# Patient Record
Sex: FEMALE | Race: OTHER
[De-identification: names, ages, dates, MRNs, and addresses within clinical notes are randomized per-mention and may not be internally consistent; named-entity substitution may affect disease eponyms.]

---

## 2019-06-14 ENCOUNTER — HOSPITAL ENCOUNTER (INPATIENT)
Dept: HOSPITAL 63 - GEROPSY | Age: 69
LOS: 10 days | Discharge: HOME | DRG: 57 | End: 2019-06-24
Attending: PSYCHIATRY & NEUROLOGY | Admitting: PSYCHIATRY & NEUROLOGY
Payer: MEDICARE

## 2019-06-14 VITALS — BODY MASS INDEX: 36.08 KG/M2 | HEIGHT: 66 IN | WEIGHT: 224.5 LBS

## 2019-06-14 VITALS — DIASTOLIC BLOOD PRESSURE: 85 MMHG | SYSTOLIC BLOOD PRESSURE: 127 MMHG

## 2019-06-14 DIAGNOSIS — Z83.3: ICD-10-CM

## 2019-06-14 DIAGNOSIS — F31.64: ICD-10-CM
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
## 2019-06-14 DIAGNOSIS — Z86.73: ICD-10-CM
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
## 2019-06-14 DIAGNOSIS — Z86.718: ICD-10-CM

## 2019-06-14 DIAGNOSIS — Z88.5: ICD-10-CM

## 2019-06-14 DIAGNOSIS — F01.50: ICD-10-CM

## 2019-06-14 DIAGNOSIS — Z86.19: ICD-10-CM

## 2019-06-14 DIAGNOSIS — E11.9: ICD-10-CM

## 2019-06-14 DIAGNOSIS — Z79.899: ICD-10-CM

## 2019-06-14 DIAGNOSIS — Z79.4: ICD-10-CM
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
## 2019-06-14 DIAGNOSIS — Z88.8: ICD-10-CM

## 2019-06-14 DIAGNOSIS — G47.33: ICD-10-CM

## 2019-06-14 DIAGNOSIS — J45.909: ICD-10-CM
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
## 2019-06-14 DIAGNOSIS — R29.6: ICD-10-CM

## 2019-06-14 DIAGNOSIS — I10: ICD-10-CM

## 2019-06-14 DIAGNOSIS — Z82.49: ICD-10-CM

## 2019-06-14 DIAGNOSIS — Z90.49: ICD-10-CM

## 2019-06-14 DIAGNOSIS — Z87.891: ICD-10-CM

## 2019-06-14 DIAGNOSIS — F63.9: ICD-10-CM

## 2019-06-14 DIAGNOSIS — S02.2XXA: ICD-10-CM

## 2019-06-14 DIAGNOSIS — K57.30: ICD-10-CM

## 2019-06-14 DIAGNOSIS — Z86.711: ICD-10-CM

## 2019-06-14 DIAGNOSIS — F02.80: ICD-10-CM

## 2019-06-14 DIAGNOSIS — Z79.01: ICD-10-CM

## 2019-06-14 DIAGNOSIS — F41.9: ICD-10-CM

## 2019-06-14 DIAGNOSIS — Z91.81: ICD-10-CM

## 2019-06-14 DIAGNOSIS — G30.9: Primary | ICD-10-CM

## 2019-06-14 DIAGNOSIS — S00.83XA: ICD-10-CM

## 2019-06-14 DIAGNOSIS — E78.5: ICD-10-CM

## 2019-06-14 DIAGNOSIS — Z91.018: ICD-10-CM

## 2019-06-14 DIAGNOSIS — G40.409: ICD-10-CM

## 2019-06-14 DIAGNOSIS — E89.0: ICD-10-CM

## 2019-06-14 DIAGNOSIS — D68.51: ICD-10-CM

## 2019-06-14 DIAGNOSIS — Z91.14: ICD-10-CM

## 2019-06-14 DIAGNOSIS — Z98.51: ICD-10-CM

## 2019-06-14 DIAGNOSIS — Z91.040: ICD-10-CM

## 2019-06-14 DIAGNOSIS — Z87.11: ICD-10-CM

## 2019-06-14 PROCEDURE — 73130 X-RAY EXAM OF HAND: CPT

## 2019-06-14 PROCEDURE — 86592 SYPHILIS TEST NON-TREP QUAL: CPT

## 2019-06-14 PROCEDURE — 82140 ASSAY OF AMMONIA: CPT

## 2019-06-14 PROCEDURE — 36415 COLL VENOUS BLD VENIPUNCTURE: CPT

## 2019-06-14 PROCEDURE — 80053 COMPREHEN METABOLIC PANEL: CPT

## 2019-06-14 PROCEDURE — 85610 PROTHROMBIN TIME: CPT

## 2019-06-14 PROCEDURE — 83036 HEMOGLOBIN GLYCOSYLATED A1C: CPT

## 2019-06-14 PROCEDURE — 84436 ASSAY OF TOTAL THYROXINE: CPT

## 2019-06-14 PROCEDURE — 83550 IRON BINDING TEST: CPT

## 2019-06-14 PROCEDURE — 83735 ASSAY OF MAGNESIUM: CPT

## 2019-06-14 PROCEDURE — 81001 URINALYSIS AUTO W/SCOPE: CPT

## 2019-06-14 PROCEDURE — 83540 ASSAY OF IRON: CPT

## 2019-06-14 PROCEDURE — 93005 ELECTROCARDIOGRAM TRACING: CPT

## 2019-06-14 PROCEDURE — 84480 ASSAY TRIIODOTHYRONINE (T3): CPT

## 2019-06-14 PROCEDURE — 80061 LIPID PANEL: CPT

## 2019-06-14 PROCEDURE — 82947 ASSAY GLUCOSE BLOOD QUANT: CPT

## 2019-06-14 PROCEDURE — 85025 COMPLETE CBC W/AUTO DIFF WBC: CPT

## 2019-06-14 PROCEDURE — 80164 ASSAY DIPROPYLACETIC ACD TOT: CPT

## 2019-06-14 PROCEDURE — 84443 ASSAY THYROID STIM HORMONE: CPT

## 2019-06-14 PROCEDURE — 82306 VITAMIN D 25 HYDROXY: CPT

## 2019-06-14 RX ADMIN — INSULIN HUMAN SCH UNITS: 100 INJECTION, SUSPENSION SUBCUTANEOUS at 19:53

## 2019-06-14 RX ADMIN — PANTOPRAZOLE SODIUM SCH MG: 40 TABLET, DELAYED RELEASE ORAL at 21:07

## 2019-06-14 RX ADMIN — LACOSAMIDE SCH MG: 50 TABLET, FILM COATED ORAL at 21:07

## 2019-06-14 NOTE — PDOC
Exam


Note:


Darin Note:


Please also refer to the separate dictated note~for this date of service 

dictated separately. Discussed the patient with Nursing staff reviewed the 

chart.~Reviewed interim history and current functioning. Reviewed vital 

signs,~Labs/ Radiology~and current medications noted below. Continue current 

treatment with the changes noted in the dictated addendum note





Assessment:


Vital Signs/I&O:





                                   Vital Signs








  Date Time  Temp Pulse Resp B/P (MAP) Pulse Ox O2 Delivery O2 Flow Rate FiO2


 


6/14/19 14:30 98.4 75 16 127/85 (99) 98   








Labs:





                                Laboratory Tests








Test


 6/14/19


16:30 6/14/19


19:44


 


Glucose (Fingerstick)


 318 mg/dL


(70-99)  H 492 mg/dL


(70-99)  H











Current Medications:


Meds:





Current Medications








 Medications


  (Trade)  Dose


 Ordered  Sig/Carly


 Route


 PRN Reason  Start Time


 Stop Time Status Last Admin


Dose Admin


 


 Olanzapine


  (ZyPREXA ZYDIS)  5 mg  QHS


 PO


   6/14/19 21:00


    6/14/19 21:07





 


 Lacosamide


  (Vimpat)  200 mg  BID


 PO


   6/14/19 21:00


    6/14/19 21:07





 


 Levetiracetam


  (Keppra)  500 mg  BID


 PO


   6/14/19 21:00


    6/14/19 21:07





 


 Pantoprazole


 Sodium


  (Protonix)  40 mg  QHS


 PO


   6/14/19 21:00


    6/14/19 21:07





 


 Warfarin Sodium


  (Coumadin)  5 mg  1X WARF  ONCE


 PO


   6/14/19 17:30


 6/14/19 17:31 DC 6/14/19 17:54





 


 Insulin Human


 Isoph/Insulin


 Regular


  (HumuLIN 70/30)  20 units  BIDWMEALS


 SQ


   6/14/19 20:00


    6/14/19 19:53





 


 Insulin Human


 Regular


  (HumuLIN R VIAL)  20 unit  1X  ONCE


 SQ


   6/14/19 20:30


 6/14/19 20:36 DC 6/14/19 20:54











I have reviewed the current psychotropics carefully including drug interactions.

 Risk benefit ratio favors no change other than as noted in my dictated progress

note.





Diagnosis:


Problems:  


(1) Anxiety disorder


(2) Impulse control disorder


(3) Psychosis, atypical


(4) Dementia, vascular, with delusions











SHANTANU CALLAHAN MD                 Jun 14, 2019 22:45

## 2019-06-15 VITALS — SYSTOLIC BLOOD PRESSURE: 124 MMHG | DIASTOLIC BLOOD PRESSURE: 78 MMHG

## 2019-06-15 VITALS — SYSTOLIC BLOOD PRESSURE: 162 MMHG | DIASTOLIC BLOOD PRESSURE: 89 MMHG

## 2019-06-15 LAB
ALBUMIN SERPL-MCNC: 3.5 G/DL (ref 3.4–5)
ALBUMIN/GLOB SERPL: 0.9 {RATIO} (ref 1–1.7)
ALP SERPL-CCNC: 81 U/L (ref 46–116)
ALT SERPL-CCNC: 21 U/L (ref 14–59)
ANION GAP SERPL CALC-SCNC: 6 MMOL/L (ref 6–14)
APTT PPP: YELLOW S
AST SERPL-CCNC: 16 U/L (ref 15–37)
BACTERIA #/AREA URNS HPF: 0 /HPF
BASOPHILS # BLD AUTO: 0 X10^3/UL (ref 0–0.2)
BASOPHILS NFR BLD: 1 % (ref 0–3)
BILIRUB SERPL-MCNC: 0.5 MG/DL (ref 0.2–1)
BILIRUB UR QL STRIP: (no result)
BUN/CREAT SERPL: 16 (ref 6–20)
CA-I SERPL ISE-MCNC: 18 MG/DL (ref 7–20)
CALCIUM SERPL-MCNC: 9.1 MG/DL (ref 8.5–10.1)
CHLORIDE SERPL-SCNC: 102 MMOL/L (ref 98–107)
CHOLEST/HDLC SERPL: 3 {RATIO}
CO2 SERPL-SCNC: 32 MMOL/L (ref 21–32)
CREAT SERPL-MCNC: 1.1 MG/DL (ref 0.6–1)
EOSINOPHIL NFR BLD: 0.3 X10^3/UL (ref 0–0.7)
EOSINOPHIL NFR BLD: 8 % (ref 0–3)
ERYTHROCYTE [DISTWIDTH] IN BLOOD BY AUTOMATED COUNT: 17.6 % (ref 11.5–14.5)
FIBRINOGEN PPP-MCNC: CLEAR MG/DL
GFR SERPLBLD BASED ON 1.73 SQ M-ARVRAT: 49 ML/MIN
GLOBULIN SER-MCNC: 3.8 G/DL (ref 2.2–3.8)
GLUCOSE SERPL-MCNC: 148 MG/DL (ref 70–99)
GLUCOSE UR STRIP-MCNC: 100 MG/DL
HCT VFR BLD CALC: 36.1 % (ref 36–47)
HDLC SERPL-MCNC: 45 MG/DL (ref 40–60)
HGB BLD-MCNC: 11.9 G/DL (ref 12–15.5)
LDLC: 85 MG/DL (ref 0–100)
LYMPHOCYTES # BLD: 1.5 X10^3/UL (ref 1–4.8)
LYMPHOCYTES NFR BLD AUTO: 45 % (ref 24–48)
MAGNESIUM SERPL-MCNC: 1.8 MG/DL (ref 1.8–2.4)
MCH RBC QN AUTO: 30 PG (ref 25–35)
MCHC RBC AUTO-ENTMCNC: 33 G/DL (ref 31–37)
MCV RBC AUTO: 92 FL (ref 79–100)
MONO #: 0.3 X10^3/UL (ref 0–1.1)
MONOCYTES NFR BLD: 8 % (ref 0–9)
NEUT #: 1.3 X10^3UL (ref 1.8–7.7)
NEUTROPHILS NFR BLD AUTO: 39 % (ref 31–73)
NITRITE UR QL STRIP: (no result)
PLATELET # BLD AUTO: 169 X10^3/UL (ref 140–400)
POTASSIUM SERPL-SCNC: 3.9 MMOL/L (ref 3.5–5.1)
PROT SERPL-MCNC: 7.3 G/DL (ref 6.4–8.2)
RBC # BLD AUTO: 3.91 X10^6/UL (ref 3.5–5.4)
RBC #/AREA URNS HPF: 0 /HPF (ref 0–2)
SODIUM SERPL-SCNC: 140 MMOL/L (ref 136–145)
SP GR UR STRIP: 1.01
SQUAMOUS #/AREA URNS LPF: (no result) /LPF
T3 SERPL-MCNC: 81 NG/DL (ref 71–180)
T4 SERPL-MCNC: 9.7 UG/DL (ref 4.5–12)
THYROID STIM HORMONE (TSH): 6.61 UIU/ML (ref 0.36–3.74)
TRIGL SERPL-MCNC: 61 MG/DL (ref 0–150)
UROBILINOGEN UR-MCNC: 0.2 MG/DL
VAL ACID: 4 MCG/ML (ref 50–100)
VLDLC: 12 MG/DL (ref 0–40)
WBC # BLD AUTO: 3.3 X10^3/UL (ref 4–11)
WBC #/AREA URNS HPF: (no result) /HPF (ref 0–4)

## 2019-06-15 RX ADMIN — INSULIN HUMAN SCH UNITS: 100 INJECTION, SUSPENSION SUBCUTANEOUS at 17:08

## 2019-06-15 RX ADMIN — Medication SCH MCG: at 08:14

## 2019-06-15 RX ADMIN — LACOSAMIDE SCH MG: 50 TABLET, FILM COATED ORAL at 08:12

## 2019-06-15 RX ADMIN — VITAMIN D, TAB 1000IU (100/BT) SCH UNIT: 25 TAB at 08:12

## 2019-06-15 RX ADMIN — LOSARTAN POTASSIUM SCH MG: 50 TABLET, FILM COATED ORAL at 08:14

## 2019-06-15 RX ADMIN — LEVOTHYROXINE SODIUM SCH MCG: 100 TABLET ORAL at 04:40

## 2019-06-15 RX ADMIN — MULTIPLE VITAMINS W/ MINERALS TAB SCH TAB: TAB at 08:12

## 2019-06-15 RX ADMIN — MEMANTINE HYDROCHLORIDE SCH MG: 5 TABLET ORAL at 08:14

## 2019-06-15 RX ADMIN — EZETIMIBE SCH MG: 10 TABLET ORAL at 08:12

## 2019-06-15 RX ADMIN — Medication PRN EACH: at 11:16

## 2019-06-15 RX ADMIN — LACOSAMIDE SCH MG: 50 TABLET, FILM COATED ORAL at 20:19

## 2019-06-15 RX ADMIN — INSULIN HUMAN SCH UNITS: 100 INJECTION, SUSPENSION SUBCUTANEOUS at 08:15

## 2019-06-15 RX ADMIN — ACETAMINOPHEN PRN MG: 325 TABLET, FILM COATED ORAL at 04:40

## 2019-06-15 RX ADMIN — PANTOPRAZOLE SODIUM SCH MG: 40 TABLET, DELAYED RELEASE ORAL at 20:20

## 2019-06-15 NOTE — CONS
DATE OF CONSULTATION:  06/15/2019



ATTENDING PHYSICIAN:  Walter Negron MD



REASON FOR CONSULTATION:  We are asked to see this patient on medical

consultation.



HISTORY OF PRESENT ILLNESS:  The patient is a 69-year-old female transferred

here yesterday from [facility].  She had been living at

[facility].  She had fallen.  She had mental status changes.  She has underlying

dementia.  She had been on Coumadin.  Her INR was elevated to 3.4.  There is a

longstanding history of dementia.  She is on Coumadin for factor V Leiden

deficiency.  When I saw her today, she was reasonable.  She had no acute

distress.  I reviewed her medications.  She could not give me much more history.

 Most of the history is obtained from charts from [facility].



PAST MEDICAL HISTORY:  Significant for asthma, major depression, type 2 diabetes

mellitus, factor V Leiden deficiency with long-term anticoagulation, hearing

loss, chickenpox, remote history of DVT, pulmonary embolus, hypertension,

hyperlipidemia, hypothyroidism, noncompliance of medications, obstructive sleep

apnea, peptic ulcer disease, seizure disorder, sigmoid diverticulosis, old

stroke.



PAST SURGICAL HISTORY:  Includes nasal septal surgery repair, patellar surgery,

cholecystectomy, rotator cuff repair, tubal ligation, and partial thyroidectomy.



FAMILY HISTORY:  Her sister has dementia, another sister had type 2 diabetes and

heart disease.  Mom and dad also had heart failure.  Hypertension in brother,

mother, and 2 sisters.



SOCIAL HISTORY:  She quit smoking 35 years ago.  She does not use any alcohol at

this time.



CURRENT MEDICATIONS:  Reviewed.  She is scheduled for Tylenol, Mylanta, vitamin

B12, Voltaren, Depakote, Zetia, Vimpat, Keppra, Synthroid, losartan, milk of

magnesia, Namenda, multivitamin, Zyprexa, Protonix, vitamin D, and Coumadin.



ALLERGIES:  Multiple including MORPHINE, CRANBERRY GRAPEFRUIT EXTRACT, LATEX,

LISINOPRIL CAUSING A COUGH, NICKEL, ZOCOR, LANTUS INSULIN and ACTOS, exact

etiology is unclear.



REVIEW OF SYSTEMS:  Significant for the reports from [facility].  She denied

any fevers, chills, or sweats.  All other systems reviewed and determined to be

negative.



PHYSICAL EXAMINATION:

GENERAL:  When I saw her, this is a pleasant elderly female.  She has bilateral

orbital ecchymoses from recent trauma.

VITAL SIGNS:  Showed that she was afebrile.  Her blood pressure from admission

was 127/85, pulse 75 and regular.  She was afebrile.

HEENT:  Head is without trauma.  Pupils are reactive.  Sclerae are nonicteric. 

There is bilateral orbital ecchymoses.  There is also a small hematoma mid

forehead.

NECK:  Supple.  No bruits identified.

LUNGS:  Otherwise clear.

CARDIOVASCULAR:  Showed regular heart tones.  No gallops, no murmurs. 

Peripheral pulses are palpable and full.

ABDOMEN:  Obese, protuberant.  No organomegaly.  Bowel sounds are hypoactive.

EXTREMITIES:  Showed no cyanosis, 2+ edema.

NEUROLOGIC:  Speech is fluent, focally intact.  No deficits.



PERTINENT LABORATORY DATA:  Nonfasting blood sugar was recorded at 152. 

Creatinine is 1.1 mg/dL.  Electrolytes are within range.  Her hemoglobin was

11.9 g/dL with white count of 3300.  Her INR measured this morning was

therapeutic at 1.9.



ASSESSMENT:

1.  This 69-year-old female has underlying dementia.

2.  Aggressive behavioral changes, which prompted the transfer here.

3.  Recent fall with bilateral orbital ecchymoses.  She is on chronic Coumadin

therapy.

4.  Factor V Leiden deficiency with previous blood clots in the legs and lungs.

5.  Type 2 diabetes.

6.  Essential hypertension.



RECOMMENDATIONS:

1.  The patient is stable from medical standpoint.

2.  I would continue her Coumadin dosage with close monitoring of the INR.

3.  I reviewed her medications.  I would recommend that we discontinue the

Voltaren given the propensity for internal bleeding while on Coumadin.

4.  Continue other home meds including her seizure meds.

5.  We should gladly follow along this nice patient during the course of her

hospitalization.



Thank you again for asking me to see this patient for medical consultation.





______________________________

ANAMARIA DICKENS MD



DR:  FELICE/odilia  JOB#:  [id number] / [id number]

DD:  06/15/2019 13:00  DT:  06/15/2019 21:20



RUTH Caballero MD

## 2019-06-15 NOTE — PDOC
Exam


Note:


Darin Note:


Please also refer to the separate dictated note~for this date of service 

dictated separately.~Patient seen individually. Discussed the patient with 

Nursing staff reviewed the chart.~Reviewed interim history and current 

functioning. Reviewed vital signs,~Labs/ Radiology~and current medications noted

below. Continue current treatment with the changes noted in the dictated 

addendum note





Assessment:


Vital Signs/I&O:





                                   Vital Signs








  Date Time  Temp Pulse Resp B/P (MAP) Pulse Ox O2 Delivery O2 Flow Rate FiO2


 


6/15/19 15:49 97.9 68 16 162/89 (113) 97   


 


6/15/19 05:46      Room Air  














                                    I & O   


 


 6/14/19 6/14/19 6/15/19





 14:59 22:59 06:59


 


Intake Total  120 ml 120 ml


 


Balance  120 ml 120 ml








Labs:





                                Laboratory Tests








Test


 6/14/19


23:31 6/15/19


07:24 6/15/19


07:27 6/15/19


09:15


 


Glucose (Fingerstick)


 289 mg/dL


(70-99)  H 


 152 mg/dL


(70-99)  H 





 


White Blood Count


 


 3.3 x10^3/uL


(4.0-11.0)  L 


 





 


Red Blood Count


 


 3.91 x10^6/uL


(3.50-5.40) 


 





 


Hemoglobin


 


 11.9 g/dL


(12.0-15.5)  L 


 





 


Hematocrit


 


 36.1 %


(36.0-47.0) 


 





 


Mean Corpuscular Volume


 


 92 fL ()


 


 





 


Mean Corpuscular Hemoglobin  30 pg (25-35)    


 


Mean Corpuscular Hemoglobin


Concent 


 33 g/dL


(31-37) 


 





 


Red Cell Distribution Width


 


 17.6 %


(11.5-14.5)  H 


 





 


Platelet Count


 


 169 x10^3/uL


(140-400) 


 





 


Neutrophils (%) (Auto)  39 % (31-73)    


 


Lymphocytes (%) (Auto)  45 % (24-48)    


 


Monocytes (%) (Auto)  8 % (0-9)    


 


Eosinophils (%) (Auto)  8 % (0-3)  H  


 


Basophils (%) (Auto)  1 % (0-3)    


 


Neutrophils # (Auto)


 


 1.3 x10^3uL


(1.8-7.7)  L 


 





 


Lymphocytes # (Auto)


 


 1.5 x10^3/uL


(1.0-4.8) 


 





 


Monocytes # (Auto)


 


 0.3 x10^3/uL


(0.0-1.1) 


 





 


Eosinophils # (Auto)


 


 0.3 x10^3/uL


(0.0-0.7) 


 





 


Basophils # (Auto)


 


 0.0 x10^3/uL


(0.0-0.2) 


 





 


Sodium Level


 


 140 mmol/L


(136-145) 


 





 


Potassium Level


 


 3.9 mmol/L


(3.5-5.1) 


 





 


Chloride Level


 


 102 mmol/L


() 


 





 


Carbon Dioxide Level


 


 32 mmol/L


(21-32) 


 





 


Anion Gap  6 (6-14)    


 


Blood Urea Nitrogen


 


 18 mg/dL


(7-20) 


 





 


Creatinine


 


 1.1 mg/dL


(0.6-1.0)  H 


 





 


Estimated GFR


(Cockcroft-Gault) 


 49.0  


 


 





 


BUN/Creatinine Ratio  16 (6-20)    


 


Glucose Level


 


 148 mg/dL


(70-99)  H 


 





 


Calcium Level


 


 9.1 mg/dL


(8.5-10.1) 


 





 


Magnesium Level


 


 1.8 mg/dL


(1.8-2.4) 


 





 


Iron Level


 


 73 ug/dL


() 


 





 


Total Iron Binding Capacity


 


 258 ug/dL


(250-450) 


 





 


Iron Saturation  28 % (15-34)    


 


Total Bilirubin


 


 0.5 mg/dL


(0.2-1.0) 


 





 


Aspartate Amino Transferase


(AST) 


 16 U/L (15-37)


 


 





 


Alanine Aminotransferase (ALT)


 


 21 U/L (14-59)


 


 





 


Alkaline Phosphatase


 


 81 U/L


() 


 





 


Total Protein


 


 7.3 g/dL


(6.4-8.2) 


 





 


Albumin


 


 3.5 g/dL


(3.4-5.0) 


 





 


Albumin/Globulin Ratio


 


 0.9 (1.0-1.7)


L 


 





 


Triglycerides Level


 


 61 mg/dL


(0-150) 


 





 


Cholesterol Level


 


 142 mg/dL


(0-200) 


 





 


LDL Cholesterol, Calculated


 


 85 mg/dL


(0-100) 


 





 


VLDL Cholesterol, Calculated


 


 12 mg/dL


(0-40) 


 





 


Non-HDL Cholesterol Calculated


 


 97 mg/dL


(0-129) 


 





 


HDL Cholesterol


 


 45 mg/dL


(40-60) 


 





 


Cholesterol/HDL Ratio  3.0    


 


Thyroid Stimulating Hormone


(TSH) 


 6.609 uIU/mL


(0.358-3.740) 


 





 


Thyroxine (T4)


 


 9.7 ug/dL


(4.5-12.0) 


 





 


Total Triiodothyronine (TT3)


 


 81 ng/dL


() 


 





 


Valproic Acid Level


 


 4 mcg/mL


()  L 


 





 


Valproic Acid Last Dose Date  06/14/2019    


 


Valproic Acid Last Dose Time  0900    


 


Prothrombin Time


 


 


 


 18.6 SEC


(9.4-11.4)  H


 


Prothrombin Time INR


 


 


 


 1.9 (0.9-1.1)


H


 


Test


 6/15/19


12:09 6/15/19


13:45 6/15/19


16:42 6/15/19


19:38


 


Glucose (Fingerstick)


 183 mg/dL


(70-99)  H 


 272 mg/dL


(70-99)  H 278 mg/dL


(70-99)  H


 


Urine Collection Type  Void    


 


Urine Color  Yellow    


 


Urine Clarity  Clear    


 


Urine pH  5.5    


 


Urine Specific Gravity  1.010    


 


Urine Protein


 


 Neg


(NEG-TRACE) 


 





 


Urine Glucose (UA)


 


 100 mg/dL


(NEG) 


 





 


Urine Ketones (Stick)


 


 Neg mg/dL


(NEG) 


 





 


Urine Blood  Neg (NEG)    


 


Urine Nitrite  Neg (NEG)    


 


Urine Bilirubin  Neg (NEG)    


 


Urine Urobilinogen Dipstick


 


 0.2 mg/dL (0.2


mg/dL) 


 





 


Urine Leukocyte Esterase  Neg (NEG)    


 


Urine RBC  0 /HPF (0-2)    


 


Urine WBC


 


 Occ /HPF (0-4)


 


 





 


Urine Squamous Epithelial


Cells 


 Few /LPF  


 


 





 


Urine Bacteria


 


 0 /HPF (0-FEW)


 


 














Current Medications:


Meds:





Current Medications








 Medications


  (Trade)  Dose


 Ordered  Sig/Carly


 Route


 PRN Reason  Start Time


 Stop Time Status Last Admin


Dose Admin


 


 Vitamin D


  (Vitamin D3)  1,000 unit  DAILY


 PO


   6/15/19 09:00


    6/15/19 08:12





 


 Cyanocobalamin


  (Vitamin B-12)  1,000 mcg  DAILY


 PO


   6/15/19 09:00


    6/15/19 08:14





 


 Losartan Potassium


  (Cozaar)  50 mg  DAILY


 PO


   6/15/19 09:00


    6/15/19 08:14





 


 Divalproex Sodium


  (Depakote


 Sprinkles)  125 mg  DAILY


 PO


   6/15/19 09:00


 6/15/19 22:26 DC 6/15/19 08:13





 


 EZETIMIBE


  (Zetia)  10 mg  DAILY


 PO


   6/15/19 09:00


    6/15/19 08:12





 


 Levothyroxine


 Sodium


  (Synthroid)  200 mcg  DAILY06


 PO


   6/15/19 06:00


    6/15/19 04:40





 


 Memantine


  (Namenda)  5 mg  DAILY


 PO


   6/15/19 09:00


    6/15/19 08:14





 


 Multivitamins/


 Calcium


  (Thera-M Plus)  2 tab  DAILY


 PO


   6/15/19 09:00


    6/15/19 08:12





 


 Warfarin Sodium


  (Coumadin)  7.5 mg  1X WARF  ONCE


 PO


   6/15/19 16:00


 6/15/19 16:01 DC 6/15/19 17:07











I have reviewed the current psychotropics carefully including drug interactions.

 Risk benefit ratio favors no change other than as noted in my dictated progress

note.





Diagnosis:


Problems:  


(1) Anxiety disorder


(2) Impulse control disorder


(3) Psychosis, atypical


(4) Dementia, vascular, with delusions











SHANTANU CALLAHAN MD                 Al 15, 2019 22:55

## 2019-06-16 VITALS — SYSTOLIC BLOOD PRESSURE: 102 MMHG | DIASTOLIC BLOOD PRESSURE: 58 MMHG

## 2019-06-16 VITALS — DIASTOLIC BLOOD PRESSURE: 85 MMHG | SYSTOLIC BLOOD PRESSURE: 138 MMHG

## 2019-06-16 RX ADMIN — LACOSAMIDE SCH MG: 50 TABLET, FILM COATED ORAL at 19:54

## 2019-06-16 RX ADMIN — DIVALPROEX SODIUM SCH MG: 125 CAPSULE, COATED PELLETS ORAL at 07:49

## 2019-06-16 RX ADMIN — RISPERIDONE SCH MG: 0.25 TABLET ORAL at 19:55

## 2019-06-16 RX ADMIN — DIVALPROEX SODIUM SCH MG: 125 CAPSULE, COATED PELLETS ORAL at 19:54

## 2019-06-16 RX ADMIN — ACETAMINOPHEN PRN MG: 325 TABLET, FILM COATED ORAL at 22:24

## 2019-06-16 RX ADMIN — MEMANTINE HYDROCHLORIDE SCH MG: 5 TABLET ORAL at 07:45

## 2019-06-16 RX ADMIN — LOSARTAN POTASSIUM SCH MG: 50 TABLET, FILM COATED ORAL at 07:45

## 2019-06-16 RX ADMIN — LACOSAMIDE SCH MG: 50 TABLET, FILM COATED ORAL at 07:49

## 2019-06-16 RX ADMIN — VITAMIN D, TAB 1000IU (100/BT) SCH UNIT: 25 TAB at 07:45

## 2019-06-16 RX ADMIN — INSULIN HUMAN SCH UNITS: 100 INJECTION, SUSPENSION SUBCUTANEOUS at 07:44

## 2019-06-16 RX ADMIN — INSULIN HUMAN SCH UNITS: 100 INJECTION, SUSPENSION SUBCUTANEOUS at 18:17

## 2019-06-16 RX ADMIN — LEVOTHYROXINE SODIUM SCH MCG: 100 TABLET ORAL at 06:07

## 2019-06-16 RX ADMIN — Medication SCH MCG: at 07:45

## 2019-06-16 RX ADMIN — EZETIMIBE SCH MG: 10 TABLET ORAL at 07:45

## 2019-06-16 RX ADMIN — MULTIPLE VITAMINS W/ MINERALS TAB SCH TAB: TAB at 07:45

## 2019-06-16 RX ADMIN — PANTOPRAZOLE SODIUM SCH MG: 40 TABLET, DELAYED RELEASE ORAL at 19:54

## 2019-06-16 RX ADMIN — Medication PRN EACH: at 07:35

## 2019-06-16 NOTE — HP
ADMIT DATE:  06/14/2019



PSYCHIATRIC ADMISSION HISTORY/EVALUATION



This late entry 6/14/2019 covers elements not covered in my initial note.  I met

with the patient evening of 06/14/2019 for this evaluation.



IDENTIFYING DATA:  The patient is a 69-year-old  female referred to us

from [facility] in [place], [place], after she

presented there from [facility] on account of increased

agitation, aggression, having visual and auditory hallucinations, significant

sleep disturbance and delusions.  She believed that people's heads were being

cut off and put on plate.  She was threatening to cut her 's throat.  She

was striking out at staff and aggressive.  Staff had to call the police for

assistance due to her dangerous, out of control, unmanageable behaviors and she

was sent to the Emergency Room.  Adjustments had been made in her psychotropics,

all of which had failed outpatient.



CHIEF COMPLAINT:  "Yes they are cutting off the heads.  I heard him talking

about it.  No, it is not in my mind.  You can hear it as well."  The patient is

quite suspicious, talking in a low tone wanting to make sure no one else heard

her, since they would conspire against her as a consequence of this.  Quite

paranoid, psychotic.



HISTORY OF PRESENT ILLNESS:  The patient has a history of worsening psychotic

symptoms with the above auditory and questionable visual hallucinations, sleep

and appetite disturbance.  Behaviors have been unmanageable, dangerous.  She

does have a history of mood swings, but no clear past diagnosis of

schizoaffective disorder or schizophrenia.  She has had some short-term memory

deficits as well, but the psychosis has been prominent.



PAST PSYCHIATRIC HISTORY:  As above.



MEDICAL HISTORY:  Positive for asthma, type 2 diabetes mellitus, factor V Leiden

mutation, hearing loss, left ear, hypertension, hyperlipidemia, hypothyroidism,

sleep apnea, peptic ulcer disease, seizure disorder, sigmoid diverticulosis,

status post cerebrovascular accident, history of chickenpox, history of deep

vein thrombosis and pulmonary embolism, gallbladder removal, foot surgery, nasal

septum surgery, patellar surgery, rotator cuff surgery, thyroidectomy,

tonsillectomy, tubal ligation.



DIET:  Diabetic diet.



ALLERGIES:  MORPHINE, CRANBERRY, GRAPEFRUIT, LATEX, LISINOPRIL, NICKEL, LAUNDRY,

DETERGENTS, PERFUMES, SIMVASTATIN, LANTUS, PIOGLITAZONE.



CODE STATUS:  Full code.  Takes medications whole.  Ambulates usually in

wheelchair with assistance and has had frequent falls.



CURRENT PSYCHOTROPICS:  Depakote 125 mg daily sprinkles, Zyprexa 5 mg at

bedtime, lacosamide 200 mg b.i.d., Keppra 500 mg b.i.d., Namenda 5 mg daily,

recently added.



FAMILY HISTORY:  Noncontributory.



SOCIAL HISTORY:  No history of alcohol, drug abuse, physical, sexual or elder

abuse.  She is not known to be a perpetrator.



REACTION TO HOSPITALIZATION:  The patient accepting of it.



ASSETS:  Supportive family, stable living at the nursing home.  Family is

looking for long-term placement.



MENTAL STATUS EXAMINATION:  The patient was seen individually evening of

06/14/2019.  She is oriented to herself and situation.  Speech is low in volume,

coherent, quite paranoid, suspicious.  Abstraction fair, computation impaired,

language function intact, attention span short.  She is quite distractible.  No

active suicidal or homicidal ideation.  She remains quite delusional, paranoid

during our lengthy interview.



LABORATORY DATA:  Reviewed.



IMPRESSION:  Psychotic disorder, unspecified versus psychosis due to general

medical condition consequent to her seizures, status post cerebrovascular

accident.  Mild cognitive impairment versus major neurocognitive disorder,

vascular with delusion, depression; anxiety disorder, unspecified; impulse

control disorder, unspecified.  Rest as above.



PLAN:  Admit to geropsychiatry Unit at [facility].  I will see the

patient daily individually from a psychiatric standpoint.  Medical followup with

Dr. Rehman/Dr. Barney.  Continue current psychotropics.  Check a valproic acid level. 

If it is subtherapeutic, adjust Depakote to reach therapeutic level.  May

consider changing Zyprexa to Risperdal, which should be more efficacious for her

psychosis.  May have a CT head done and may consider Neurology consult, Dr. Mejía for her seizure disorder to make sure this is adequately controlled.



Estimated length of stay, 10-12 days.



DISCHARGE PLANS:  Back to nursing home when stable.





______________________________

MAN SHEFALI CALLAHAN MD



DR:  SANA/odilia  JOB#:  [id number] / [id number]

DD:  06/16/2019 14:15  DT:  06/16/2019 15:11

## 2019-06-16 NOTE — PN
DATE:  06/15/2019



PSYCHIATRIC PROGRESS NOTE



This late entry 06/15/2019, covers elements not covered in my initial note.



SUBJECTIVE:  I met with the patient in the evening.  The patient slept 4-1/2

hours previous night.  Previous night, she was talking about people chopping off

the head of others.  She remains anxious, restless, extremely paranoid, as I met

with her.



REVIEW OF SYSTEMS:  Ambulation impaired, in wheelchair.  No CV, , pulmonary,

eye, ENT system symptoms on review.



MENTAL STATUS EXAM:  Oriented to herself and situation.  Speech has some

latency, coherent.  I met with her in her room.  Abstraction fair, computation

impaired, language function intact, attention span short.



IMPRESSION:  Psychotic disorder, unspecified versus psychosis due to general

medical condition, major neurocognitive disorder, vascular with delusion,

depression; anxiety disorder, unspecified.  Rest unchanged from admission.



PLAN:  Change Zyprexa to Risperdal 0.25 mg p.o. at bedtime.  Valproic acid level

subtherapeutic at 4.  We will increase Depakote Sprinkles from 125 mg daily to

250 mg twice a day.  Follow labs, CBC, CMP, valproic acid level in 3 days. 

Adjust to reach therapeutic level.





______________________________

SHANTANU CALLAHAN MD



DR:  SANA/odilia  JOB#:  [id number] / [id number]

DD:  06/16/2019 14:17  DT:  06/16/2019 20:58

## 2019-06-16 NOTE — PDOC
Exam


Note:


Darin Note:


Please also refer to the separate dictated note~for this date of service 

dictated separately.~Patient seen individually. Discussed the patient with 

Nursing staff reviewed the chart.~Reviewed interim history and current 

functioning. Reviewed vital signs,~Labs/ Radiology~and current medications noted

below. Continue current treatment with the changes noted in the dictated 

addendum note





Assessment:


Vital Signs/I&O:





                                   Vital Signs








  Date Time  Temp Pulse Resp B/P (MAP) Pulse Ox O2 Delivery O2 Flow Rate FiO2


 


6/16/19 15:32 98.5 68 18 138/85 (102) 95   


 


6/15/19 05:46      Room Air  














                                    I & O   


 


 6/15/19 6/15/19 6/16/19





 15:00 23:00 07:00


 


Intake Total 1080 ml 240 ml 240 ml


 


Balance 1080 ml 240 ml 240 ml








Labs:





                                Laboratory Tests








Test


 6/16/19


06:37 6/16/19


07:09 6/16/19


11:49 6/16/19


16:53


 


Prothrombin Time


 18.8 SEC


(9.4-11.4)  H 


 


 





 


Prothrombin Time INR


 1.9 (0.9-1.1)


H 


 


 





 


Glucose (Fingerstick)


 


 210 mg/dL


(70-99)  H 207 mg/dL


(70-99)  H 283 mg/dL


(70-99)  H


 


Test


 6/16/19


20:16 


 


 





 


Glucose (Fingerstick)


 290 mg/dL


(70-99)  H 


 


 














Current Medications:


Meds:





Current Medications








 Medications


  (Trade)  Dose


 Ordered  Sig/Carly


 Route


 PRN Reason  Start Time


 Stop Time Status Last Admin


Dose Admin


 


 Divalproex Sodium


  (Depakote


 Sprinkles)  250 mg  BID


 PO


   6/16/19 09:00


    6/16/19 19:54





 


 Risperidone


  (RisperDAL)  0.25 mg  QHS


 PO


   6/16/19 21:00


    6/16/19 19:55





 


 Warfarin Sodium


  (Coumadin)  7.5 mg  1X WARF  ONCE


 PO


   6/16/19 16:00


 6/16/19 16:01 DC 6/16/19 16:01











I have reviewed the current psychotropics carefully including drug interactions.

 Risk benefit ratio favors no change other than as noted in my dictated progress

note.





Diagnosis:


Problems:  


(1) Anxiety disorder


(2) Impulse control disorder


(3) Psychosis, atypical


(4) Dementia, vascular, with delusions











SHANTANU CALLAHAN MD                 Jun 16, 2019 22:23

## 2019-06-17 VITALS — DIASTOLIC BLOOD PRESSURE: 79 MMHG | SYSTOLIC BLOOD PRESSURE: 151 MMHG

## 2019-06-17 VITALS — SYSTOLIC BLOOD PRESSURE: 133 MMHG | DIASTOLIC BLOOD PRESSURE: 81 MMHG

## 2019-06-17 LAB — HBA1C MFR BLD: 11 % (ref 4.8–5.6)

## 2019-06-17 RX ADMIN — RISPERIDONE SCH MG: 0.25 TABLET ORAL at 19:32

## 2019-06-17 RX ADMIN — DIVALPROEX SODIUM SCH MG: 125 CAPSULE, COATED PELLETS ORAL at 08:27

## 2019-06-17 RX ADMIN — MEMANTINE HYDROCHLORIDE SCH MG: 5 TABLET ORAL at 08:28

## 2019-06-17 RX ADMIN — VITAMIN D, TAB 1000IU (100/BT) SCH UNIT: 25 TAB at 08:27

## 2019-06-17 RX ADMIN — WARFARIN SODIUM SCH MG: 7.5 TABLET ORAL at 16:13

## 2019-06-17 RX ADMIN — LACOSAMIDE SCH MG: 50 TABLET, FILM COATED ORAL at 08:29

## 2019-06-17 RX ADMIN — LOSARTAN POTASSIUM SCH MG: 50 TABLET, FILM COATED ORAL at 08:27

## 2019-06-17 RX ADMIN — DIVALPROEX SODIUM SCH MG: 125 CAPSULE, COATED PELLETS ORAL at 19:32

## 2019-06-17 RX ADMIN — LEVOTHYROXINE SODIUM SCH MCG: 100 TABLET ORAL at 05:45

## 2019-06-17 RX ADMIN — EZETIMIBE SCH MG: 10 TABLET ORAL at 08:27

## 2019-06-17 RX ADMIN — LACOSAMIDE SCH MG: 50 TABLET, FILM COATED ORAL at 19:33

## 2019-06-17 RX ADMIN — INSULIN HUMAN SCH UNITS: 100 INJECTION, SUSPENSION SUBCUTANEOUS at 08:46

## 2019-06-17 RX ADMIN — MULTIPLE VITAMINS W/ MINERALS TAB SCH TAB: TAB at 08:26

## 2019-06-17 RX ADMIN — Medication SCH MCG: at 08:27

## 2019-06-17 RX ADMIN — INSULIN HUMAN SCH UNITS: 100 INJECTION, SUSPENSION SUBCUTANEOUS at 17:09

## 2019-06-17 RX ADMIN — PANTOPRAZOLE SODIUM SCH MG: 40 TABLET, DELAYED RELEASE ORAL at 19:32

## 2019-06-17 RX ADMIN — Medication PRN EACH: at 14:57

## 2019-06-17 NOTE — PDOC
Exam


Note:


Darin Note:


Please also refer to the separate dictated note~for this date of service 

dictated separately.~Patient seen individually. Discussed the patient with 

Nursing staff reviewed the chart.~Reviewed interim history and current 

functioning. Reviewed vital signs,~Labs/ Radiology~and current medications noted

below. Continue current treatment with the changes noted in the dictated 

addendum note





Assessment:


Vital Signs/I&O:





                                   Vital Signs








  Date Time  Temp Pulse Resp B/P (MAP) Pulse Ox O2 Delivery O2 Flow Rate FiO2


 


6/17/19 16:09 97.6 81 18 151/79 (103) 98   


 


6/15/19 05:46      Room Air  














                                    I & O   


 


 6/16/19 6/16/19 6/17/19





 14:59 22:59 06:59


 


Intake Total 840 ml 320 ml 360 ml


 


Balance 840 ml 320 ml 360 ml








Labs:





                                Laboratory Tests








Test


 6/17/19


07:32 6/17/19


08:53 6/17/19


12:18 6/17/19


16:36


 


Glucose (Fingerstick)


 175 mg/dL


(70-99)  H 


 206 mg/dL


(70-99)  H 288 mg/dL


(70-99)  H


 


Prothrombin Time


 


 18.5 SEC


(9.4-11.4)  H 


 





 


Prothrombin Time INR


 


 1.9 (0.9-1.1)


H 


 





 


Test


 6/17/19


19:47 


 


 





 


Glucose (Fingerstick)


 358 mg/dL


(70-99)  H 


 


 














Current Medications:


Meds:





Current Medications








 Medications


  (Trade)  Dose


 Ordered  Sig/Carly


 Route


 PRN Reason  Start Time


 Stop Time Status Last Admin


Dose Admin


 


 Warfarin Sodium


  (Coumadin)  7.5 mg  DAILY16


 PO


   6/17/19 16:00


    6/17/19 16:13











I have reviewed the current psychotropics carefully including drug interactions.

 Risk benefit ratio favors no change other than as noted in my dictated progress

note.





Diagnosis:


Problems:  


(1) Anxiety disorder


(2) Impulse control disorder


(3) Psychosis, atypical


(4) Dementia, vascular, with delusions











SHANTANU CALLAHAN MD                 Jun 17, 2019 22:38

## 2019-06-17 NOTE — EKG
[facility] [address], [place] [address]

Test Date:    2019               Test Time:    17:14:49

Pat Name:     DAVID BLACKWELL            Department:   

Patient ID:   [id number]           Room:         [address] [place] [address]

Gender:       F                        Technician:   

:          [id number]               Requested By: SHANTANU CALLAHAN

Order Number: [id number]            Reading MD:     

                                 Measurements

Intervals                              Axis          

Rate:         74                       P:            37

MA:           138                      QRS:          26

QRSD:         82                       T:            34

QT:           376                                    

QTc:          418                                    

                           Interpretive Statements

SINUS RHYTHM

NORMAL ECG

RI6.02

No previous ECG available for comparison

## 2019-06-18 VITALS — SYSTOLIC BLOOD PRESSURE: 140 MMHG | DIASTOLIC BLOOD PRESSURE: 85 MMHG

## 2019-06-18 VITALS — SYSTOLIC BLOOD PRESSURE: 123 MMHG | DIASTOLIC BLOOD PRESSURE: 79 MMHG

## 2019-06-18 RX ADMIN — RISPERIDONE SCH MG: 0.5 TABLET ORAL at 19:39

## 2019-06-18 RX ADMIN — WARFARIN SODIUM SCH MG: 7.5 TABLET ORAL at 16:18

## 2019-06-18 RX ADMIN — Medication SCH MCG: at 08:29

## 2019-06-18 RX ADMIN — DIVALPROEX SODIUM SCH MG: 125 CAPSULE, COATED PELLETS ORAL at 08:28

## 2019-06-18 RX ADMIN — MULTIPLE VITAMINS W/ MINERALS TAB SCH TAB: TAB at 08:28

## 2019-06-18 RX ADMIN — INSULIN HUMAN SCH UNITS: 100 INJECTION, SUSPENSION SUBCUTANEOUS at 17:16

## 2019-06-18 RX ADMIN — MEMANTINE HYDROCHLORIDE SCH MG: 5 TABLET ORAL at 08:28

## 2019-06-18 RX ADMIN — Medication PRN EACH: at 13:47

## 2019-06-18 RX ADMIN — INSULIN HUMAN SCH UNITS: 100 INJECTION, SUSPENSION SUBCUTANEOUS at 08:32

## 2019-06-18 RX ADMIN — LEVOTHYROXINE SODIUM SCH MCG: 100 TABLET ORAL at 06:04

## 2019-06-18 RX ADMIN — LACOSAMIDE SCH MG: 50 TABLET, FILM COATED ORAL at 08:30

## 2019-06-18 RX ADMIN — DIVALPROEX SODIUM SCH MG: 125 CAPSULE, COATED PELLETS ORAL at 19:37

## 2019-06-18 RX ADMIN — LACOSAMIDE SCH MG: 50 TABLET, FILM COATED ORAL at 19:40

## 2019-06-18 RX ADMIN — PANTOPRAZOLE SODIUM SCH MG: 40 TABLET, DELAYED RELEASE ORAL at 19:38

## 2019-06-18 RX ADMIN — LOSARTAN POTASSIUM SCH MG: 50 TABLET, FILM COATED ORAL at 08:29

## 2019-06-18 RX ADMIN — VITAMIN D, TAB 1000IU (100/BT) SCH UNIT: 25 TAB at 08:28

## 2019-06-18 RX ADMIN — EZETIMIBE SCH MG: 10 TABLET ORAL at 08:28

## 2019-06-18 NOTE — PN
DATE:  06/16/2019



PSYCHIATRIC PROGRESS NOTE



This late entry 06/16/2019 covers elements not covered in my initial note.



SUBJECTIVE:  I met with the patient in the evening at some length.  The patient

slept 7 hours previous night, was quite delusional, psychotic at night about

people's heads being cuff off, but during the day on 06/16/2019, this is better.

 We will request her past psychiatric records from [facility].



REVIEW OF SYSTEMS:  No CV, , pulmonary, eye, ENT system symptoms on review.



MENTAL STATUS EXAM:  Oriented to herself and situation.  Speech has some

latency, coherent.  Abstraction fair, computation impaired, language function

intact, attention span short.  She is less paranoid, delusional effects,

specifically and pointedly questioned her at the evening of 06/16/2019.



LABORATORY DATA:  Reviewed.



IMPRESSION:  Psychotic disorder, unspecified, rule out major neurocognitive

disorder, Alzheimer, vascular with delusions.  Rest unchanged.



PLAN:  Get results of CT head done at [facility].  Continue

Depakote.  Adjust to reach therapeutic level and repeat labs level has been

ordered.  Zyprexa has been changed to Risperdal.  Maintain the rest of her

psychotropics for now.





______________________________

SHANTANU CALLAHAN MD



DR:  SANA/odilia  JOB#:  [id number] / [id number]

DD:  06/17/2019 15:52  DT:  06/18/2019 01:06

## 2019-06-18 NOTE — PN
DATE:  06/17/2019



PSYCHIATRIC PROGRESS NOTE



This late entry 06/17/2019 covers elements not covered in my initial note.



SUBJECTIVE:  I met with the patient in the evening.  The patient slept 8-1/4

hours previous night.  Her bruises, periorbital, seemed to be gradually

improving.  She is alert, oriented, compliant with medications.  As I met with

her individually at length, she is still paranoid, believes people's heads are

being chopped off.  When I shared with her that looked into it and this was not

corroborated, she was quite adamant that no one would share the secrets with me.



REVIEW OF SYSTEMS:  Ambulation impaired, in wheelchair.  No CV, , pulmonary,

eye system symptoms on review.



MENTAL STATUS EXAM:  Oriented to herself and situation.  Speech is coherent,

rapid at times.  Abstraction fair, computation impaired, language function

intact, attention span short.  Mood and affect somewhat anxious, labile, remains

quite psychotic.



LABORATORY DATA:  Reviewed.



IMPRESSION:  Psychotic disorder, unspecified versus bipolar, mixed with

psychotic features, mild cognitive impairment versus major neurocognitive

disorder, Alzheimer, vascular with delusion, depression.  Rest unchanged.



PLAN:  Continue current psychotropics.  Check labs level on the Depakote, adjust

to reach therapeutic level.  Maintain rest of the treatment for her seizure

disorder.  Neurology consult with Dr. Mejía has been requested.





______________________________

MAN SHEFALI CALLAHAN MD



DR:  SANA/odilia  JOB#:  [id number] / [id number]

DD:  06/18/2019 09:40  DT:  06/18/2019 14:07

## 2019-06-18 NOTE — PDOC
Exam


Note:


Darin Note:


Please also refer to the separate dictated note~for this date of service 

dictated separately.~Patient seen individually. Discussed the patient with 

Nursing staff reviewed the chart.~Reviewed interim history and current 

functioning. Reviewed vital signs,~Labs/ Radiology~and current medications noted

below. Continue current treatment with the changes noted in the dictated 

addendum note





Assessment:


Vital Signs/I&O:





                                   Vital Signs








  Date Time  Temp Pulse Resp B/P (MAP) Pulse Ox O2 Delivery O2 Flow Rate FiO2


 


6/18/19 15:48 98.7 88 20 140/85 (103) 97   


 


6/15/19 05:46      Room Air  














                                    I & O   


 


 6/17/19 6/17/19 6/18/19





 14:59 22:59 06:59


 


Intake Total 720 ml 240 ml 120 ml


 


Balance 720 ml 240 ml 120 ml








Labs:





                                Laboratory Tests








Test


 6/18/19


07:45 6/18/19


08:03 6/18/19


12:18 6/18/19


16:48


 


Prothrombin Time


 19.1 SEC


(9.4-11.4)  H 


 


 





 


Prothrombin Time INR


 2.0 (0.9-1.1)


H 


 


 





 


Glucose (Fingerstick)


 


 199 mg/dL


(70-99)  H 307 mg/dL


(70-99)  H 221 mg/dL


(70-99)  H


 


Test


 6/18/19


19:29 


 


 





 


Glucose (Fingerstick)


 193 mg/dL


(70-99)  H 


 


 














Current Medications:


Meds:





Current Medications








 Medications


  (Trade)  Dose


 Ordered  Sig/Carly


 Route


 PRN Reason  Start Time


 Stop Time Status Last Admin


Dose Admin


 


 Insulin Human


 Isoph/Insulin


 Regular


  (HumuLIN 70/30)  25 units  BIDWMEALS


 SQ


   6/18/19 08:00


    6/18/19 17:16





 


 Risperidone


  (RisperDAL)  0.5 mg  QHS


 PO


   6/18/19 21:00


    6/18/19 19:39











I have reviewed the current psychotropics carefully including drug interactions.

 Risk benefit ratio favors no change other than as noted in my dictated progress

note.





Diagnosis:


Problems:  


(1) Anxiety disorder


(2) Impulse control disorder


(3) Psychosis, atypical


(4) Dementia, vascular, with delusions











SHANTANU CALLAHAN MD                 Jun 18, 2019 22:19

## 2019-06-19 VITALS — DIASTOLIC BLOOD PRESSURE: 82 MMHG | SYSTOLIC BLOOD PRESSURE: 146 MMHG

## 2019-06-19 VITALS — SYSTOLIC BLOOD PRESSURE: 151 MMHG | DIASTOLIC BLOOD PRESSURE: 86 MMHG

## 2019-06-19 LAB
ALBUMIN SERPL-MCNC: 3.3 G/DL (ref 3.4–5)
ALBUMIN/GLOB SERPL: 0.9 {RATIO} (ref 1–1.7)
ALP SERPL-CCNC: 67 U/L (ref 46–116)
ALT SERPL-CCNC: 19 U/L (ref 14–59)
ANION GAP SERPL CALC-SCNC: 8 MMOL/L (ref 6–14)
AST SERPL-CCNC: 16 U/L (ref 15–37)
BASOPHILS # BLD AUTO: 0 X10^3/UL (ref 0–0.2)
BASOPHILS NFR BLD: 1 % (ref 0–3)
BILIRUB SERPL-MCNC: 0.5 MG/DL (ref 0.2–1)
BUN/CREAT SERPL: 13 (ref 6–20)
CA-I SERPL ISE-MCNC: 13 MG/DL (ref 7–20)
CALCIUM SERPL-MCNC: 8.8 MG/DL (ref 8.5–10.1)
CHLORIDE SERPL-SCNC: 104 MMOL/L (ref 98–107)
CO2 SERPL-SCNC: 29 MMOL/L (ref 21–32)
CREAT SERPL-MCNC: 1 MG/DL (ref 0.6–1)
EOSINOPHIL NFR BLD: 0.2 X10^3/UL (ref 0–0.7)
EOSINOPHIL NFR BLD: 6 % (ref 0–3)
ERYTHROCYTE [DISTWIDTH] IN BLOOD BY AUTOMATED COUNT: 17.7 % (ref 11.5–14.5)
GFR SERPLBLD BASED ON 1.73 SQ M-ARVRAT: 54.7 ML/MIN
GLOBULIN SER-MCNC: 3.7 G/DL (ref 2.2–3.8)
GLUCOSE SERPL-MCNC: 150 MG/DL (ref 70–99)
HCT VFR BLD CALC: 35.3 % (ref 36–47)
HGB BLD-MCNC: 11.7 G/DL (ref 12–15.5)
LYMPHOCYTES # BLD: 1.5 X10^3/UL (ref 1–4.8)
LYMPHOCYTES NFR BLD AUTO: 48 % (ref 24–48)
MCH RBC QN AUTO: 30 PG (ref 25–35)
MCHC RBC AUTO-ENTMCNC: 33 G/DL (ref 31–37)
MCV RBC AUTO: 91 FL (ref 79–100)
MONO #: 0.2 X10^3/UL (ref 0–1.1)
MONOCYTES NFR BLD: 7 % (ref 0–9)
NEUT #: 1.2 X10^3UL (ref 1.8–7.7)
NEUTROPHILS NFR BLD AUTO: 38 % (ref 31–73)
PLATELET # BLD AUTO: 177 X10^3/UL (ref 140–400)
POTASSIUM SERPL-SCNC: 4.1 MMOL/L (ref 3.5–5.1)
PROT SERPL-MCNC: 7 G/DL (ref 6.4–8.2)
RBC # BLD AUTO: 3.86 X10^6/UL (ref 3.5–5.4)
SODIUM SERPL-SCNC: 141 MMOL/L (ref 136–145)
VAL ACID: 36 MCG/ML (ref 50–100)
WBC # BLD AUTO: 3.1 X10^3/UL (ref 4–11)

## 2019-06-19 RX ADMIN — LOSARTAN POTASSIUM SCH MG: 50 TABLET, FILM COATED ORAL at 08:25

## 2019-06-19 RX ADMIN — DIVALPROEX SODIUM SCH MG: 125 CAPSULE, COATED PELLETS ORAL at 19:23

## 2019-06-19 RX ADMIN — RISPERIDONE SCH MG: 0.5 TABLET ORAL at 19:23

## 2019-06-19 RX ADMIN — LEVOTHYROXINE SODIUM SCH MCG: 100 TABLET ORAL at 06:06

## 2019-06-19 RX ADMIN — DIVALPROEX SODIUM SCH MG: 125 CAPSULE, COATED PELLETS ORAL at 08:25

## 2019-06-19 RX ADMIN — Medication PRN EACH: at 13:24

## 2019-06-19 RX ADMIN — EZETIMIBE SCH MG: 10 TABLET ORAL at 08:28

## 2019-06-19 RX ADMIN — PANTOPRAZOLE SODIUM SCH MG: 40 TABLET, DELAYED RELEASE ORAL at 19:22

## 2019-06-19 RX ADMIN — LACOSAMIDE SCH MG: 50 TABLET, FILM COATED ORAL at 08:28

## 2019-06-19 RX ADMIN — MEMANTINE HYDROCHLORIDE SCH MG: 5 TABLET ORAL at 08:25

## 2019-06-19 RX ADMIN — Medication SCH MCG: at 08:25

## 2019-06-19 RX ADMIN — VITAMIN D, TAB 1000IU (100/BT) SCH UNIT: 25 TAB at 08:25

## 2019-06-19 RX ADMIN — WARFARIN SODIUM SCH MG: 7.5 TABLET ORAL at 17:07

## 2019-06-19 RX ADMIN — LACOSAMIDE SCH MG: 50 TABLET, FILM COATED ORAL at 19:22

## 2019-06-19 RX ADMIN — INSULIN HUMAN SCH UNITS: 100 INJECTION, SUSPENSION SUBCUTANEOUS at 17:07

## 2019-06-19 RX ADMIN — MULTIPLE VITAMINS W/ MINERALS TAB SCH TAB: TAB at 08:25

## 2019-06-19 RX ADMIN — INSULIN HUMAN SCH UNITS: 100 INJECTION, SUSPENSION SUBCUTANEOUS at 07:42

## 2019-06-19 NOTE — PDOC
Exam


Note:


Darin Note:


Please also refer to the separate dictated note~for this date of service 

dictated separately.~Patient seen individually. Discussed the patient with 

Nursing staff reviewed the chart.~Reviewed interim history and current 

functioning. Reviewed vital signs,~Labs/ Radiology~and current medications noted

below. Continue current treatment with the changes noted in the dictated 

addendum note





Assessment:


Vital Signs/I&O:





                                   Vital Signs








  Date Time  Temp Pulse Resp B/P (MAP) Pulse Ox O2 Delivery O2 Flow Rate FiO2


 


6/19/19 16:03 97.8 85 19 151/86 (107) 97   


 


6/19/19 06:09      Room Air  














                                    I & O   


 


 6/18/19 6/18/19 6/19/19





 14:59 22:59 06:59


 


Intake Total 720 ml 240 ml 120 ml


 


Balance 720 ml 240 ml 120 ml








Labs:





                                Laboratory Tests








Test


 6/19/19


06:50 6/19/19


07:24 6/19/19


11:49 6/19/19


16:43


 


White Blood Count


 3.1 x10^3/uL


(4.0-11.0)  L 


 


 





 


Red Blood Count


 3.86 x10^6/uL


(3.50-5.40) 


 


 





 


Hemoglobin


 11.7 g/dL


(12.0-15.5)  L 


 


 





 


Hematocrit


 35.3 %


(36.0-47.0)  L 


 


 





 


Mean Corpuscular Volume


 91 fL ()


 


 


 





 


Mean Corpuscular Hemoglobin 30 pg (25-35)     


 


Mean Corpuscular Hemoglobin


Concent 33 g/dL


(31-37) 


 


 





 


Red Cell Distribution Width


 17.7 %


(11.5-14.5)  H 


 


 





 


Platelet Count


 177 x10^3/uL


(140-400) 


 


 





 


Neutrophils (%) (Auto) 38 % (31-73)     


 


Lymphocytes (%) (Auto) 48 % (24-48)     


 


Monocytes (%) (Auto) 7 % (0-9)     


 


Eosinophils (%) (Auto) 6 % (0-3)  H   


 


Basophils (%) (Auto) 1 % (0-3)     


 


Neutrophils # (Auto)


 1.2 x10^3uL


(1.8-7.7)  L 


 


 





 


Lymphocytes # (Auto)


 1.5 x10^3/uL


(1.0-4.8) 


 


 





 


Monocytes # (Auto)


 0.2 x10^3/uL


(0.0-1.1) 


 


 





 


Eosinophils # (Auto)


 0.2 x10^3/uL


(0.0-0.7) 


 


 





 


Basophils # (Auto)


 0.0 x10^3/uL


(0.0-0.2) 


 


 





 


Prothrombin Time


 18.9 SEC


(9.4-11.4)  H 


 


 





 


Prothrombin Time INR


 2.0 (0.9-1.1)


H 


 


 





 


Sodium Level


 141 mmol/L


(136-145) 


 


 





 


Potassium Level


 4.1 mmol/L


(3.5-5.1) 


 


 





 


Chloride Level


 104 mmol/L


() 


 


 





 


Carbon Dioxide Level


 29 mmol/L


(21-32) 


 


 





 


Anion Gap 8 (6-14)     


 


Blood Urea Nitrogen


 13 mg/dL


(7-20) 


 


 





 


Creatinine


 1.0 mg/dL


(0.6-1.0) 


 


 





 


Estimated GFR


(Cockcroft-Gault) 54.7  


 


 


 





 


BUN/Creatinine Ratio 13 (6-20)     


 


Glucose Level


 150 mg/dL


(70-99)  H 


 


 





 


Calcium Level


 8.8 mg/dL


(8.5-10.1) 


 


 





 


Total Bilirubin


 0.5 mg/dL


(0.2-1.0) 


 


 





 


Aspartate Amino Transferase


(AST) 16 U/L (15-37)


 


 


 





 


Alanine Aminotransferase (ALT)


 19 U/L (14-59)


 


 


 





 


Alkaline Phosphatase


 67 U/L


() 


 


 





 


Total Protein


 7.0 g/dL


(6.4-8.2) 


 


 





 


Albumin


 3.3 g/dL


(3.4-5.0)  L 


 


 





 


Albumin/Globulin Ratio


 0.9 (1.0-1.7)


L 


 


 





 


Valproic Acid Level


 36 mcg/mL


()  L 


 


 





 


Valproic Acid Last Dose Date 06/18/2019     


 


Valproic Acid Last Dose Time 2100     


 


Glucose (Fingerstick)


 


 142 mg/dL


(70-99)  H 262 mg/dL


(70-99)  H 180 mg/dL


(70-99)  H


 


Test


 6/19/19


19:10 


 


 





 


Glucose (Fingerstick)


 246 mg/dL


(70-99)  H 


 


 














Current Medications:


Meds:





Current Medications








 Medications


  (Trade)  Dose


 Ordered  Sig/Carly


 Route


 PRN Reason  Start Time


 Stop Time Status Last Admin


Dose Admin


 


 Divalproex Sodium


  (Depakote


 Sprinkles)  375 mg  BID


 PO


   6/19/19 21:00


    6/19/19 19:23











I have reviewed the current psychotropics carefully including drug interactions.

 Risk benefit ratio favors no change other than as noted in my dictated progress

note.





Diagnosis:


Problems:  


(1) Anxiety disorder


(2) Impulse control disorder


(3) Psychosis, atypical


(4) Dementia, vascular, with delusions











SHANTANU CALLAHAN MD                 Jun 19, 2019 22:16

## 2019-06-20 VITALS — SYSTOLIC BLOOD PRESSURE: 163 MMHG | DIASTOLIC BLOOD PRESSURE: 73 MMHG

## 2019-06-20 VITALS — SYSTOLIC BLOOD PRESSURE: 142 MMHG | DIASTOLIC BLOOD PRESSURE: 84 MMHG

## 2019-06-20 RX ADMIN — WARFARIN SODIUM SCH MG: 7.5 TABLET ORAL at 20:09

## 2019-06-20 RX ADMIN — EZETIMIBE SCH MG: 10 TABLET ORAL at 08:08

## 2019-06-20 RX ADMIN — WARFARIN SODIUM SCH MG: 7.5 TABLET ORAL at 08:07

## 2019-06-20 RX ADMIN — DIVALPROEX SODIUM SCH MG: 125 CAPSULE, COATED PELLETS ORAL at 20:12

## 2019-06-20 RX ADMIN — LOSARTAN POTASSIUM SCH MG: 50 TABLET, FILM COATED ORAL at 08:08

## 2019-06-20 RX ADMIN — Medication SCH MCG: at 08:07

## 2019-06-20 RX ADMIN — CETIRIZINE HYDROCHLORIDE SCH MG: 10 TABLET, FILM COATED ORAL at 08:09

## 2019-06-20 RX ADMIN — INSULIN HUMAN SCH UNITS: 100 INJECTION, SUSPENSION SUBCUTANEOUS at 17:00

## 2019-06-20 RX ADMIN — DIVALPROEX SODIUM SCH MG: 125 CAPSULE, COATED PELLETS ORAL at 08:07

## 2019-06-20 RX ADMIN — RISPERIDONE SCH MG: 0.5 TABLET ORAL at 20:09

## 2019-06-20 RX ADMIN — MEMANTINE HYDROCHLORIDE SCH MG: 5 TABLET ORAL at 08:08

## 2019-06-20 RX ADMIN — MULTIPLE VITAMINS W/ MINERALS TAB SCH TAB: TAB at 08:08

## 2019-06-20 RX ADMIN — LEVOTHYROXINE SODIUM SCH MCG: 100 TABLET ORAL at 05:25

## 2019-06-20 RX ADMIN — PANTOPRAZOLE SODIUM SCH MG: 40 TABLET, DELAYED RELEASE ORAL at 20:09

## 2019-06-20 RX ADMIN — LACOSAMIDE SCH MG: 50 TABLET, FILM COATED ORAL at 20:12

## 2019-06-20 RX ADMIN — VITAMIN D, TAB 1000IU (100/BT) SCH UNIT: 25 TAB at 08:07

## 2019-06-20 RX ADMIN — INSULIN HUMAN SCH UNITS: 100 INJECTION, SUSPENSION SUBCUTANEOUS at 08:05

## 2019-06-20 RX ADMIN — LACOSAMIDE SCH MG: 50 TABLET, FILM COATED ORAL at 08:06

## 2019-06-20 NOTE — PDOC
Exam


Note:


Darin Note:


Please also refer to the separate dictated note~for this date of service 

dictated separately.~Patient seen individually. Discussed the patient with 

Nursing staff reviewed the chart.~Reviewed interim history and current 

functioning. Reviewed vital signs,~Labs/ Radiology~and current medications noted

below. Continue current treatment with the changes noted in the dictated 

addendum note





Assessment:


Vital Signs/I&O:





                                   Vital Signs








  Date Time  Temp Pulse Resp B/P (MAP) Pulse Ox O2 Delivery O2 Flow Rate FiO2


 


6/20/19 16:37 98.6 90 20 163/73 (103) 98   


 


6/19/19 06:09      Room Air  














                                    I & O   


 


 6/19/19 6/19/19 6/20/19





 15:00 23:00 07:00


 


Intake Total 480 ml 240 ml 120 ml


 


Balance 480 ml 240 ml 120 ml








Labs:





                                Laboratory Tests








Test


 6/20/19


07:12 6/20/19


11:47 6/20/19


17:14 6/20/19


19:33


 


Glucose (Fingerstick)


 149 mg/dL


(70-99)  H 305 mg/dL


(70-99)  H 84 mg/dL


(70-99) 157 mg/dL


(70-99)  H











Current Medications:


Meds:





Current Medications








 Medications


  (Trade)  Dose


 Ordered  Sig/Carly


 Route


 PRN Reason  Start Time


 Stop Time Status Last Admin


Dose Admin


 


 Cetirizine HCl


  (ZyrTEC)  10 mg  DAILY


 PO


   6/20/19 09:00


    6/20/19 08:09





 


 Insulin Human


 Lispro


  (HumaLOG)  10 units  1X  ONCE


 SQ


   6/20/19 12:00


 6/20/19 12:02 DC 6/20/19 12:09











I have reviewed the current psychotropics carefully including drug interactions.

 Risk benefit ratio favors no change other than as noted in my dictated progress

note.





Diagnosis:


Problems:  


(1) Anxiety disorder


(2) Impulse control disorder


(3) Psychosis, atypical


(4) Dementia, vascular, with delusions











SHANTANU CALLAHAN MD                 Jun 20, 2019 22:41

## 2019-06-20 NOTE — PN
DATE:  06/18/2019



PSYCHIATRIC PROGRESS NOTE



This late entry 06/18/2019 covers elements not covered in my initial note.



SUBJECTIVE:  I met with the patient in the evening.  The patient slept 6-1/2

hours previous night.  She talked to one of the staff members previous night,

talking about people being beheaded and being raped at the hospital.  She is

persistent and fixed on this as I talked to her individually at length.



REVIEW OF SYSTEMS:  Ambulation impaired, in wheelchair.  No CV, , pulmonary,

eye, ENT system symptoms on review.  Subperiorbital hematoma is improving. 

Reliability poor.



MENTAL STATUS EXAM:  Oriented to herself and situation.  Speech has some

latency, coherent, low in volume.  Abstraction fair, computation impaired,

language function intact, attention span short.  Mood and affect remain somewhat

anxious, remains psychotic.



LABORATORY DATA:  Reviewed.



IMPRESSION:  Unchanged from initial note.



PLAN:  Increase Risperdal from 0.25 mg at bedtime to 0.5 mg p.o. at bedtime. 

Continue treatment for seizure disorder.  Check labs level on the Depakote,

adjust to reach therapeutic level.





______________________________

MAN SHEFALI CALLAHAN MD



DR:  SANA/odilia  JOB#:  [id number] / [id number]

DD:  06/19/2019 14:42  DT:  06/20/2019 00:48

## 2019-06-21 VITALS — SYSTOLIC BLOOD PRESSURE: 138 MMHG | DIASTOLIC BLOOD PRESSURE: 86 MMHG

## 2019-06-21 VITALS — DIASTOLIC BLOOD PRESSURE: 75 MMHG | SYSTOLIC BLOOD PRESSURE: 152 MMHG

## 2019-06-21 RX ADMIN — INSULIN HUMAN SCH UNITS: 100 INJECTION, SUSPENSION SUBCUTANEOUS at 17:17

## 2019-06-21 RX ADMIN — LACOSAMIDE SCH MG: 50 TABLET, FILM COATED ORAL at 20:28

## 2019-06-21 RX ADMIN — INSULIN HUMAN SCH UNITS: 100 INJECTION, SUSPENSION SUBCUTANEOUS at 10:41

## 2019-06-21 RX ADMIN — LOSARTAN POTASSIUM SCH MG: 50 TABLET, FILM COATED ORAL at 10:41

## 2019-06-21 RX ADMIN — EZETIMIBE SCH MG: 10 TABLET ORAL at 10:42

## 2019-06-21 RX ADMIN — VITAMIN D, TAB 1000IU (100/BT) SCH UNIT: 25 TAB at 10:41

## 2019-06-21 RX ADMIN — PANTOPRAZOLE SODIUM SCH MG: 40 TABLET, DELAYED RELEASE ORAL at 20:27

## 2019-06-21 RX ADMIN — CETIRIZINE HYDROCHLORIDE SCH MG: 10 TABLET, FILM COATED ORAL at 10:42

## 2019-06-21 RX ADMIN — LACOSAMIDE SCH MG: 50 TABLET, FILM COATED ORAL at 10:44

## 2019-06-21 RX ADMIN — Medication PRN EACH: at 09:10

## 2019-06-21 RX ADMIN — RISPERIDONE SCH MG: 0.5 TABLET ORAL at 20:29

## 2019-06-21 RX ADMIN — MEMANTINE HYDROCHLORIDE SCH MG: 5 TABLET ORAL at 10:41

## 2019-06-21 RX ADMIN — DIVALPROEX SODIUM SCH MG: 125 CAPSULE, COATED PELLETS ORAL at 10:42

## 2019-06-21 RX ADMIN — NYSTATIN SCH APP: 100000 POWDER TOPICAL at 20:30

## 2019-06-21 RX ADMIN — Medication SCH MCG: at 10:42

## 2019-06-21 RX ADMIN — DIVALPROEX SODIUM SCH MG: 125 CAPSULE, COATED PELLETS ORAL at 20:27

## 2019-06-21 RX ADMIN — LEVOTHYROXINE SODIUM SCH MCG: 100 TABLET ORAL at 06:14

## 2019-06-21 RX ADMIN — MULTIPLE VITAMINS W/ MINERALS TAB SCH TAB: TAB at 10:41

## 2019-06-21 NOTE — PN
DATE:  06/20/2019



PSYCHIATRIC PROGRESS NOTE



This late entry of 06/20/2019 covers the elements not covered in my initial

note.



SUBJECTIVE:  I met with the patient in the evening at length and staffed a

treatment team meeting with the entire team in the morning.  The patient slept 7

hours.  Appetite 75%.  She is little more cooperative.  Complains of pain in the

thumb, impaired ambulation, in wheelchair.  No CV, , pulmonary, eye system

symptoms on review.  She is paranoid regarding her  having an affair at

the end of the driveway.  Social service staff has checked this with the family

and family is quite clear that this is not anything but the patient's delusion. 

The patient had some very specific situations where she felt her  had an

affair.  She is irritable at times.  We reviewed her CT head.



REVIEW OF SYSTEMS:  Ambulation impaired, in wheelchair.  No CV, , pulmonary,

eye, ENT system symptoms on review.



MENTAL STATUS EXAM:  Oriented to herself and situation.  Speech coherent, less

pressured.  Abstraction fair, computation impaired, language function intact,

attention span short.  Mood and affect remain somewhat anxious, labile,

paranoid, but better than before.



LABORATORY DATA:  Reviewed.



IMPRESSION:  Probable bipolar 1 disorder, mixed with psychotic features;

psychotic disorder, unspecified; mild cognitive impairment.



CT head shows microvascular changes with some atrophy, nothing significant.  No

acute changes.



PLAN:  Continue Depakote 250 b.i.d.  Check labs level on the valproic acid,

adjust to reach a therapeutic level.  She continues on treatment for seizure

disorder.  Risperdal is at 0.5 mg p.o. at bedtime, Zyprexa was stopped.  Adjust

further as clinically indicated.





______________________________

MAN SHEFALI CALLAHAN MD



DR:  SANA/odilia  JOB#:  [id number] / [id number]

DD:  06/21/2019 12:36  DT:  06/21/2019 22:52

## 2019-06-21 NOTE — CONS
DATE OF CONSULTATION:  06/16/2019



REFERRING PHYSICIAN:  Walter Negron MD



REASON FOR CONSULTATION:  History of seizure, status post fall.



HISTORY OF PRESENT ILLNESS:  This is a 69-year-old right-handed  female

who was transferred on 06/14/2019 from [facility] in [place] on

account of behavior disturbances and agitation.  The patient was admitted to

[facility] Unit on 06/14/2019.  Neuro consult was requested because the

patient had fallen in the bathroom at home and had closed head injuries resulted

in multiple facial ecchymosis.  The patient has had history of seizure disorder

described as generalized tonic-clonic seizure.  The first seizure was in 11/2013

secondary to stroke.  The patient stated she lost her consciousness.  She would

have 2-3 seizures yearly.  The last seizure was in 02/2019.  This information

was obtained directly from the patient.  According to the patient, she fell in

the bathroom at home.  The fall resulted in closed head injuries and nasal

fracture along with hematomas and multiple facial ecchymosis.  The patient did

not lose consciousness.  Initial nonenhanced head CT scan revealed no evidence

of intracranial bleed.  The patient has been on Coumadin for factor V Leiden

deficiency and her INR was 3.4.  Currently, the patient complains of

intermittent headaches.  She denies visual disturbances, nausea, chest pain,

shortness of breath, or palpitations.



PAST MEDICAL HISTORY:  Significant for major depression, dementia, diabetes

mellitus type 2, factor V Leiden deficiency with long-term anticoagulation with

warfarin, history of DVT, pulmonary embolism, hypertension, hyperlipidemia,

hypothyroidism, obstructive sleep apnea, seizure disorder as described above,

sigmoid diverticulosis and stroke in 2013.



PAST SURGICAL HISTORY:  Significant for nasal septal surgery, cholecystectomy,

tubal ligation, partial thyroidectomy and rotator cuff repair.



SOCIAL HISTORY:  The patient denies smoking, alcohol drinking, or illicit drug

use.



FAMILY HISTORY:  Positive for diabetes mellitus, heart disease and dementia

affecting her sister.  Mother had heart disease, heart failure, hypertension and

father had hypertension.



CURRENT HOME MEDICATIONS:  Keppra, Vimpat, Zetia, Depakote, Voltaren, vitamin

B12, Tylenol, losartan, Synthroid, Namenda, multivitamin, Zyprexa, Protonix,

vitamin D, and Coumadin.



ALLERGIES:  THE PATIENT IS ALLERGIC TO LATEX, CRANBERRY AND GRAPEFRUIT EXTRACT,

MORPHINE, ZOCOR, LANTUS INSULIN, ACTOS.



REVIEW OF SYSTEMS:  A 10-point review of system was performed as mentioned above

in history of present illness.



PHYSICAL EXAMINATION:

GENERAL:  Well-developed, well-nourished female, not in acute distress.  She

weighs 99 kilos.

VITAL SIGNS:  Afebrile, blood pressure 133/81, respiratory rate 16, pulse is 75

and regular, temperature 97.5, oxygen saturation 94% on room air.

HEENT:  Normocephalic, atraumatic.  The patient has bilateral orbital ecchymosis

from recent injuries and fall and also she had multiple facial ecchymosis.

NECK:  Supple.  Negative for carotid bruit, lymphadenopathy or thyromegaly.

LUNGS:  Clear to A and P.

CARDIOVASCULAR:  Regular rhythm, normal S1, S2.  There is no S3, S4 or murmur.

ABDOMEN:  Soft.  Bowel sounds positive.

EXTREMITIES:  Negative for cyanosis, but has 2+ pitting edema bilaterally.

NEUROLOGIC:

MENTAL STATUS:  The patient is alert and oriented x 3.  The speech is fluent. 

There is no language dysfunction.  Memory, judgment, and abstract thinking are

fair.  The patient denies hallucination or delusion.

CRANIAL NERVES:  Visual fields are full.  The pupils are reactive to light and

accommodation.  The extraocular movements are intact.  There is no nystagmus. 

There is no facial motor or sensory deficit.  Hearing appeared to be diminished,

likely bilaterally.  The palate is elevated symmetrically.  Sternocleidomastoid

muscles are powerful bilaterally.  The patient shrugs her shoulders

symmetrically, protrudes her tongue in the midline without fasciculation or

atrophy.

MOTOR:  No focal muscle bulk was seen.  The tone is normal.  The strength is 4/5

throughout.

SENSORY EXAMINATION:  Revealed diminished pinprick and light touch senses in

patchy distributions in both lower extremities.  Deep tendon reflexes were

symmetric and hypoactive with absent Achilles responses.  Gait is normal.



LABORATORY DATA:  CBC revealed white blood cells of 3.3 thousand, hemoglobin

11.9, hematocrit 36.1, platelet count 169.  Chemistry revealed normal lipid

profile and elevated TSH 6.6 with normal T4 and T3.  Urinalysis is negative for

urinary tract infections.



IMPRESSION:

1.  Status post fall at home resulted in close head injury and possible nasal

fracture and multiple facial ecchymosis including bilateral orbits.

2.  Long-term anticoagulation therapy due to previous DVT and pulmonary embolism

with current elevated INR at 3.4 and negative CT scan for intracranial bleed. 

The patient has been on anticoagulation therapy for factor V Leiden deficiency.

3.  History of stroke without significant focal residuals.

4.  Multiple medical problems include diabetes mellitus type 2, hypertension,

hypothyroidism, long history of dementia.

5.  Multiple psychiatric problems include depressions, anxiety, and behavior

disturbances.

6.  History of seizure disorder.  The patient has been on multiple

anticonvulsant including Vimpat and Keppra.



RECOMMENDATIONS:

1.  Continue with current anticonvulsant.

2.  Treat the underlying multiple medical and psychiatric care.

3.  We will discontinue Voltaren as the patient has elevated INR and high risk

for intracranial bleed in conjunction with Coumadin.





______________________________

M ABRAHAN MCDANIEL MD



DR:  ARMANDO/odilia  JOB#:  [id number] / [id number]

DD:  06/20/2019 07:27  DT:  06/21/2019 00:10

## 2019-06-21 NOTE — PN
DATE:  06/19/2019



PSYCHIATRIC PROGRESS NOTE



This is a late entry 06/19/2019, covers the elements not covered in my initial

note.



SUBJECTIVE:  I met with the patient in the evening.  The patient slept 7-1/4

hours previous night.  She did well the previous night, had a good day.  Her

 visited during the day on 06/19/2019 and she was quite irritable after

this.  She is fixated on why her closet was locked and still paranoid that

people are being killed and raped, but less intense.



REVIEW OF SYSTEMS:  Ambulation impaired, in wheelchair.  No CV, , pulmonary,

eye, ENT system symptoms on review.



MENTAL STATUS EXAM:  Oriented to herself and situation.  Speech is coherent,

abstraction fair, computation impaired, language function intact, attention span

short.  Mood and affect remains labile.  She is reasonably oriented.



LABORATORY DATA:  Reviewed.



IMPRESSION:  Psychotic disorder, unspecified; possible bipolar 1 disorder, mixed

with psychotic features, mild cognitive impairment; anxiety disorder,

unspecified.



PLAN:  Valproic acid level is subtherapeutic and we will increase the Depakote

from 250 b.i.d. to 375 mg b.i.d.  Check CBC, CMP, valproic acid level, ammonia

level in 3 days.  Continue rest of the psychotropics unchanged.





______________________________

SHANTANU CALLAHAN MD



DR:  SANA/odilia  JOB#:  [id number] / [id number]

DD:  06/21/2019 12:04  DT:  06/21/2019 22:17

## 2019-06-21 NOTE — PDOC
Exam


Note:


Darin Note:


Please also refer to the separate dictated note~for this date of service 

dictated separately.~Patient seen individually. Discussed the patient with 

Nursing staff reviewed the chart.~Reviewed interim history and current 

functioning. Reviewed vital signs,~Labs/ Radiology~and current medications noted

below. Continue current treatment with the changes noted in the dictated 

addendum note





Assessment:


Vital Signs/I&O:





                                   Vital Signs








  Date Time  Temp Pulse Resp B/P (MAP) Pulse Ox O2 Delivery O2 Flow Rate FiO2


 


6/21/19 15:46 98.1 69 18 152/75 (100) 94 Room Air  














                                    I & O   


 


 6/20/19 6/20/19 6/21/19





 15:00 23:00 07:00


 


Intake Total 680 ml 360 ml 120 ml


 


Balance 680 ml 360 ml 120 ml








Labs:





                                Laboratory Tests








Test


 6/21/19


06:37 6/21/19


07:16 6/21/19


11:50 6/21/19


16:44


 


Prothrombin Time


 27.9 SEC


(9.4-11.4)  H 


 


 





 


Prothrombin Time INR


 2.9 (0.9-1.1)


H 


 


 





 


Glucose (Fingerstick)


 


 225 mg/dL


(70-99)  H 271 mg/dL


(70-99)  H 270 mg/dL


(70-99)  H


 


Test


 6/21/19


19:31 


 


 





 


Glucose (Fingerstick)


 317 mg/dL


(70-99)  H 


 


 














Current Medications:


Meds:





Current Medications








 Medications


  (Trade)  Dose


 Ordered  Sig/Carly


 Route


 PRN Reason  Start Time


 Stop Time Status Last Admin


Dose Admin


 


 Warfarin Sodium


  (Coumadin)  5 mg  1X WARF  ONCE


 PO


   6/21/19 16:00


 6/21/19 16:01 DC 6/21/19 17:15





 


 Nystatin


  (Nystop)  1 farrah  BID


 TP


   6/21/19 21:00


    6/21/19 20:30





 


 Insulin Human


 Isoph/Insulin


 Regular


  (HumuLIN 70/30)  20 units  TIDWMEALS


 SQ


   6/21/19 17:00


    6/21/19 17:17











I have reviewed the current psychotropics carefully including drug interactions.

 Risk benefit ratio favors no change other than as noted in my dictated progress

note.





Diagnosis:


Problems:  


(1) Anxiety disorder


(2) Impulse control disorder


(3) Psychosis, atypical


(4) Dementia, vascular, with delusions











SHANTANU CALLAHAN MD                 Jun 21, 2019 22:15

## 2019-06-22 VITALS — SYSTOLIC BLOOD PRESSURE: 138 MMHG | DIASTOLIC BLOOD PRESSURE: 81 MMHG

## 2019-06-22 VITALS — SYSTOLIC BLOOD PRESSURE: 146 MMHG | DIASTOLIC BLOOD PRESSURE: 87 MMHG

## 2019-06-22 LAB
ALBUMIN SERPL-MCNC: 3.5 G/DL (ref 3.4–5)
ALBUMIN/GLOB SERPL: 0.9 {RATIO} (ref 1–1.7)
ALP SERPL-CCNC: 77 U/L (ref 46–116)
ALT SERPL-CCNC: 20 U/L (ref 14–59)
ANION GAP SERPL CALC-SCNC: 10 MMOL/L (ref 6–14)
AST SERPL-CCNC: 15 U/L (ref 15–37)
BASOPHILS # BLD AUTO: 0 X10^3/UL (ref 0–0.2)
BASOPHILS NFR BLD: 1 % (ref 0–3)
BILIRUB SERPL-MCNC: 0.4 MG/DL (ref 0.2–1)
BUN/CREAT SERPL: 15 (ref 6–20)
CA-I SERPL ISE-MCNC: 15 MG/DL (ref 7–20)
CALCIUM SERPL-MCNC: 9.2 MG/DL (ref 8.5–10.1)
CHLORIDE SERPL-SCNC: 104 MMOL/L (ref 98–107)
CO2 SERPL-SCNC: 28 MMOL/L (ref 21–32)
CREAT SERPL-MCNC: 1 MG/DL (ref 0.6–1)
EOSINOPHIL NFR BLD: 0.2 X10^3/UL (ref 0–0.7)
EOSINOPHIL NFR BLD: 7 % (ref 0–3)
ERYTHROCYTE [DISTWIDTH] IN BLOOD BY AUTOMATED COUNT: 17.9 % (ref 11.5–14.5)
GFR SERPLBLD BASED ON 1.73 SQ M-ARVRAT: 54.7 ML/MIN
GLOBULIN SER-MCNC: 3.8 G/DL (ref 2.2–3.8)
GLUCOSE SERPL-MCNC: 197 MG/DL (ref 70–99)
HCT VFR BLD CALC: 38.1 % (ref 36–47)
HGB BLD-MCNC: 12.4 G/DL (ref 12–15.5)
LYMPHOCYTES # BLD: 1.4 X10^3/UL (ref 1–4.8)
LYMPHOCYTES NFR BLD AUTO: 52 % (ref 24–48)
MCH RBC QN AUTO: 30 PG (ref 25–35)
MCHC RBC AUTO-ENTMCNC: 33 G/DL (ref 31–37)
MCV RBC AUTO: 93 FL (ref 79–100)
MONO #: 0.2 X10^3/UL (ref 0–1.1)
MONOCYTES NFR BLD: 6 % (ref 0–9)
NEUT #: 1 X10^3UL (ref 1.8–7.7)
NEUTROPHILS NFR BLD AUTO: 34 % (ref 31–73)
PLATELET # BLD AUTO: 179 X10^3/UL (ref 140–400)
POTASSIUM SERPL-SCNC: 4.2 MMOL/L (ref 3.5–5.1)
PROT SERPL-MCNC: 7.3 G/DL (ref 6.4–8.2)
RBC # BLD AUTO: 4.12 X10^6/UL (ref 3.5–5.4)
SODIUM SERPL-SCNC: 142 MMOL/L (ref 136–145)
VAL ACID: 49 MCG/ML (ref 50–100)
WBC # BLD AUTO: 2.8 X10^3/UL (ref 4–11)

## 2019-06-22 RX ADMIN — LACOSAMIDE SCH MG: 50 TABLET, FILM COATED ORAL at 21:02

## 2019-06-22 RX ADMIN — CETIRIZINE HYDROCHLORIDE SCH MG: 10 TABLET, FILM COATED ORAL at 10:04

## 2019-06-22 RX ADMIN — VITAMIN D, TAB 1000IU (100/BT) SCH UNIT: 25 TAB at 10:03

## 2019-06-22 RX ADMIN — LACOSAMIDE SCH MG: 50 TABLET, FILM COATED ORAL at 10:06

## 2019-06-22 RX ADMIN — PANTOPRAZOLE SODIUM SCH MG: 40 TABLET, DELAYED RELEASE ORAL at 21:02

## 2019-06-22 RX ADMIN — LOSARTAN POTASSIUM SCH MG: 50 TABLET, FILM COATED ORAL at 10:04

## 2019-06-22 RX ADMIN — MEMANTINE HYDROCHLORIDE SCH MG: 5 TABLET ORAL at 10:04

## 2019-06-22 RX ADMIN — NYSTATIN SCH APP: 100000 POWDER TOPICAL at 10:05

## 2019-06-22 RX ADMIN — INSULIN HUMAN SCH UNITS: 100 INJECTION, SUSPENSION SUBCUTANEOUS at 09:22

## 2019-06-22 RX ADMIN — EZETIMIBE SCH MG: 10 TABLET ORAL at 10:03

## 2019-06-22 RX ADMIN — RISPERIDONE SCH MG: 0.5 TABLET ORAL at 21:02

## 2019-06-22 RX ADMIN — Medication SCH MCG: at 10:04

## 2019-06-22 RX ADMIN — DIVALPROEX SODIUM SCH MG: 125 CAPSULE, COATED PELLETS ORAL at 21:02

## 2019-06-22 RX ADMIN — INSULIN HUMAN SCH UNITS: 100 INJECTION, SUSPENSION SUBCUTANEOUS at 12:24

## 2019-06-22 RX ADMIN — DIVALPROEX SODIUM SCH MG: 125 CAPSULE, COATED PELLETS ORAL at 10:04

## 2019-06-22 RX ADMIN — NYSTATIN SCH APP: 100000 POWDER TOPICAL at 21:03

## 2019-06-22 RX ADMIN — LEVOTHYROXINE SODIUM SCH MCG: 100 TABLET ORAL at 06:15

## 2019-06-22 RX ADMIN — MULTIPLE VITAMINS W/ MINERALS TAB SCH TAB: TAB at 10:03

## 2019-06-22 RX ADMIN — INSULIN HUMAN SCH UNITS: 100 INJECTION, SUSPENSION SUBCUTANEOUS at 18:28

## 2019-06-22 RX ADMIN — Medication PRN EACH: at 11:27

## 2019-06-22 NOTE — PN
DATE:  06/21/2019



PSYCHIATRIC PROGRESS NOTE



This note covers elements not covered in my initial note 06/21/2019.



SUBJECTIVE:  I met with the patient in the evening.  Overall, the patient slept

5-3/4 hours previous night.  She had a telephone conversation with the daughter,

has been staying around another demented patient who is fairly organized.

Insulin dose was changed per Dr. Rehman.  She has not talked about people being

raped or killed and I pressured her to discuss this with me at length

individually and she stated "I am over that."



REVIEW OF SYSTEMS:  Ambulation impaired, in wheelchair.  No CV, , pulmonary,

eye system symptoms on review.



MENTAL STATUS EXAM:  Oriented to herself and situation.  Speech is coherent,

less pressured.  Abstraction fair, computation impaired, language function

intact, attention span short.  Mood and affect remain somewhat anxious, labile,

but better than before.



LABORATORY DATA:  Reviewed.



IMPRESSION:  Unchanged from initial note.



PLAN:  No change from initial note.





______________________________

MAN SHEFALI CALLAHAN MD



DR:  SANA/odilia  JOB#:  [id number] / [id number]

DD:  06/21/2019 22:37  DT:  06/22/2019 01:26

## 2019-06-23 VITALS — DIASTOLIC BLOOD PRESSURE: 92 MMHG | SYSTOLIC BLOOD PRESSURE: 142 MMHG

## 2019-06-23 VITALS — SYSTOLIC BLOOD PRESSURE: 154 MMHG | DIASTOLIC BLOOD PRESSURE: 85 MMHG

## 2019-06-23 RX ADMIN — INSULIN HUMAN SCH UNITS: 100 INJECTION, SUSPENSION SUBCUTANEOUS at 08:30

## 2019-06-23 RX ADMIN — NYSTATIN SCH APP: 100000 POWDER TOPICAL at 20:27

## 2019-06-23 RX ADMIN — DIVALPROEX SODIUM SCH MG: 125 CAPSULE, COATED PELLETS ORAL at 08:24

## 2019-06-23 RX ADMIN — RISPERIDONE SCH MG: 0.5 TABLET ORAL at 20:22

## 2019-06-23 RX ADMIN — Medication PRN EACH: at 12:12

## 2019-06-23 RX ADMIN — MULTIPLE VITAMINS W/ MINERALS TAB SCH TAB: TAB at 08:24

## 2019-06-23 RX ADMIN — INSULIN HUMAN SCH UNITS: 100 INJECTION, SUSPENSION SUBCUTANEOUS at 17:26

## 2019-06-23 RX ADMIN — LACOSAMIDE SCH MG: 50 TABLET, FILM COATED ORAL at 08:32

## 2019-06-23 RX ADMIN — CETIRIZINE HYDROCHLORIDE SCH MG: 10 TABLET, FILM COATED ORAL at 08:24

## 2019-06-23 RX ADMIN — Medication SCH MCG: at 08:24

## 2019-06-23 RX ADMIN — NYSTATIN SCH APP: 100000 POWDER TOPICAL at 08:32

## 2019-06-23 RX ADMIN — VITAMIN D, TAB 1000IU (100/BT) SCH UNIT: 25 TAB at 08:24

## 2019-06-23 RX ADMIN — DIVALPROEX SODIUM SCH MG: 125 CAPSULE, COATED PELLETS ORAL at 20:22

## 2019-06-23 RX ADMIN — PANTOPRAZOLE SODIUM SCH MG: 40 TABLET, DELAYED RELEASE ORAL at 20:22

## 2019-06-23 RX ADMIN — MEMANTINE HYDROCHLORIDE SCH MG: 5 TABLET ORAL at 08:25

## 2019-06-23 RX ADMIN — EZETIMIBE SCH MG: 10 TABLET ORAL at 08:25

## 2019-06-23 RX ADMIN — LACOSAMIDE SCH MG: 50 TABLET, FILM COATED ORAL at 20:27

## 2019-06-23 RX ADMIN — LOSARTAN POTASSIUM SCH MG: 50 TABLET, FILM COATED ORAL at 08:25

## 2019-06-23 RX ADMIN — INSULIN HUMAN SCH UNITS: 100 INJECTION, SUSPENSION SUBCUTANEOUS at 12:08

## 2019-06-23 RX ADMIN — LEVOTHYROXINE SODIUM SCH MCG: 100 TABLET ORAL at 04:55

## 2019-06-23 RX ADMIN — ACETAMINOPHEN PRN MG: 325 TABLET, FILM COATED ORAL at 21:27

## 2019-06-24 VITALS — DIASTOLIC BLOOD PRESSURE: 89 MMHG | SYSTOLIC BLOOD PRESSURE: 158 MMHG

## 2019-06-24 VITALS — DIASTOLIC BLOOD PRESSURE: 90 MMHG | SYSTOLIC BLOOD PRESSURE: 178 MMHG

## 2019-06-24 RX ADMIN — INSULIN HUMAN SCH UNITS: 100 INJECTION, SUSPENSION SUBCUTANEOUS at 08:50

## 2019-06-24 RX ADMIN — DIVALPROEX SODIUM SCH MG: 125 CAPSULE, COATED PELLETS ORAL at 08:47

## 2019-06-24 RX ADMIN — Medication PRN EACH: at 11:38

## 2019-06-24 RX ADMIN — LACOSAMIDE SCH MG: 50 TABLET, FILM COATED ORAL at 08:47

## 2019-06-24 RX ADMIN — Medication SCH MCG: at 08:47

## 2019-06-24 RX ADMIN — VITAMIN D, TAB 1000IU (100/BT) SCH UNIT: 25 TAB at 08:47

## 2019-06-24 RX ADMIN — INSULIN HUMAN SCH UNITS: 100 INJECTION, SUSPENSION SUBCUTANEOUS at 13:21

## 2019-06-24 RX ADMIN — LOSARTAN POTASSIUM SCH MG: 50 TABLET, FILM COATED ORAL at 08:47

## 2019-06-24 RX ADMIN — NYSTATIN SCH APP: 100000 POWDER TOPICAL at 08:47

## 2019-06-24 RX ADMIN — MULTIPLE VITAMINS W/ MINERALS TAB SCH TAB: TAB at 08:47

## 2019-06-24 RX ADMIN — CETIRIZINE HYDROCHLORIDE SCH MG: 10 TABLET, FILM COATED ORAL at 08:46

## 2019-06-24 RX ADMIN — MEMANTINE HYDROCHLORIDE SCH MG: 5 TABLET ORAL at 08:47

## 2019-06-24 RX ADMIN — LEVOTHYROXINE SODIUM SCH MCG: 100 TABLET ORAL at 05:17

## 2019-06-24 RX ADMIN — EZETIMIBE SCH MG: 10 TABLET ORAL at 08:46

## 2019-06-24 NOTE — PN
DATE:  06/23/2019



SUBJECTIVE:  The patient was seen today, met with the staff, chart reviewed and

also covering for Dr. Negron.  The patient has still slow unsteady gait, history

of falls, still has some bruises on her forehead and below eyes.  The patient is

currently on wheelchair.



OBSERVATION:

VITAL SIGNS:  Temperature 98.7, blood pressure 154/85, pulse 81, respirations

20, O2 sat 98%.



Slept about 7 hours last night.  The patient's appetite has improved.



MEDICATIONS:  The patient's current medications include Depakote 375 mg b.i.d.,

Risperdal 0.5 mg at night, Namenda 5 mg daily.  The patient is also on Keppra

500 mg b.i.d. and not having any side effects.



LABORATORY DATA:  The patient's lab reviewed.  The patient's white cell count is

low, 2.8.



ASSESSMENT:

1.  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

depression.

2.  Anxiety disorder, unspecified.

3.  History of seizure disorder and also episodic confusion.



PLAN:  Continue with the current treatment.



LENGTH OF STAY:  5-10 days.





______________________________

AMY PUGH MD DR:  EVON/odilia  JOB#:  [id number] / [id number]

DD:  06/23/2019 15:42  DT:  06/24/2019 01:44

## 2019-06-24 NOTE — DS
DATE OF DISCHARGE:  06/24/2019



FINAL DIAGNOSES:

AXIS I:

1.  Psychotic disorder, unspecified; major neurocognitive disorder vascular with

delusions.

2.  Depression.

3.  Anxiety disorder, unspecified.

4.  Impulse control disorder, unspecified; also status post cerebrovascular

accident.

AXIS II:  None

AXIS III:  Type 2 diabetes mellitus, factor V Leiden mutation, asthma, hearing

loss, hypertension, hyperlipidemia, hypothyroidism, sleep apnea, peptic ulcer

disease, seizure disorder and history of DVT and pulmonary embolism.



REASON FOR ADMISSION:  This 69-year-old  female who was admitted to

[facility] from [facility] in [place], [place].  The patient apparently was a resident at [facility]. 

Apparently, there was some brief stay and she started experiencing major

behavior problems including anger, agitation, aggression and delusions.  She

thought that people's heads were being cut off and put on plate.  She was

threatening to cut her friend's throat and she was striking at the staff, became

very aggressive, increased confusion.  Apparently, the staff had to call the

police for assistance due to dangerous behaviors and being out of control. 

Apparently, then she was sent to the Emergency Room at [facility]. 

The patient has been on psychotropics, all of which had failed.



CHIEF COMPLAINT:  "Yes, they are cutting off the heads, I heard him talking

about it".



HISTORY OF PRESENT ILLNESS:  The patient has history of psychotic symptoms

including auditory and questionable visual hallucinations, sleep problems,

appetite disturbances and the patient's behavior has been unmanageable and

dangerous.  The patient apparently also was beginning to show significant

cognitive deficits.  The patient apparently has no major psychiatric diagnosis

in the past except for memory problems.



HOSPITAL COURSE:  The patient had a physical exam, routine lab work including

CBC, chem profile, urinalysis, which were all within normal range except for a

WBC count of 2.8 and the level on admission was 3.3.  The patient's neutrophil

count was 34.  Lymphocyte count was 52, which was high.  The patient's blood

sugar was slightly elevated.  The patient's other electrolytes were within

normal range.  The patient's lipid profile was within normal range.  The

patient's TSH was 6.6.  The patient was involved in the program including

individual therapy, group therapy, and activity therapy.  The patient apparently

had multiple falls prior to coming here.  She also had bruising on her face.



The patient's lab remains the same.  No change at the time of discharge.  The

patient's medications included insulin 20 units t.i.d., Zyrtec 10 mg daily,

Depakote 375 mg b.i.d., Risperdal 0.5 mg at night, Namenda 5 mg daily, Zetia 10

mg daily, losartan 50 mg daily, vitamin B12 1000 mcg daily, levothyroxine 200

mcg daily, Protonix 40 mg daily, Keppra 500 mg b.i.d.  The patient did fairly

well.  The patient is still slow to respond to questions, but less confusion.



AFTERCARE PLAN:  This patient at the time of discharge medically stable, not

expressing any overt psychotic symptoms, not admitting to having any major

depressive symptoms, no suicidal or homicidal thoughts.  The patient also is not

exhibiting any side effects of the medications.  The patient will be returning

home to her family.  Apparently, she will be seen by her primary care doctor for

followup.  The patient will continue on the medication listed above.



Patient was on two antipsychotic drugs. Zyprexa and Risperdal. Recommend 
decreasing Zyprexa to 2.5 mg daily PO and discontinuue if possible.

______________________________

AMY PUGH MD DR:  EVON/odilia  JOB#:  [id number] / [id number]

DD:  06/24/2019 17:02  DT:  06/24/2019 21:16

MARISOL

## 2019-06-24 NOTE — RAD
3 view study of the right hand

 

Clinical indications: Pain after a fall in the first metacarpal region.

 

FINDINGS: Sesamoid bone of the first metacarpal phalangeal joint area 

appears fragmented and therefore may be fractured. No other acute fracture

or dislocation or lytic process is evident. Primary degenerative 

osteoarthritis of the scaphoid trapezium joint and the first carpal 

metacarpal joint and the first metacarpal phalangeal joint and the second 

and third metacarpal phalangeal joints and the first interphalangeal joint

is seen.

 

IMPRESSION: The sesamoid bone of the first metacarpal phalangeal joint 

area appears fragmented and therefore may be fractured.

 

Electronically signed by: Demetrius Zuniga MD (6/24/2019 2:08 PM) [id number]